# Patient Record
Sex: MALE
[De-identification: names, ages, dates, MRNs, and addresses within clinical notes are randomized per-mention and may not be internally consistent; named-entity substitution may affect disease eponyms.]

---

## 2017-01-06 ENCOUNTER — CHARTING TRANS (OUTPATIENT)
Dept: OTHER | Age: 47
End: 2017-01-06

## 2018-11-06 VITALS — HEART RATE: 86 BPM | BODY MASS INDEX: 32.58 KG/M2 | HEIGHT: 69 IN | WEIGHT: 220 LBS | TEMPERATURE: 98.7 F

## 2019-01-29 ENCOUNTER — OFFICE VISIT (OUTPATIENT)
Dept: FAMILY MEDICINE CLINIC | Facility: CLINIC | Age: 49
End: 2019-01-29

## 2019-01-29 VITALS
SYSTOLIC BLOOD PRESSURE: 138 MMHG | HEART RATE: 78 BPM | TEMPERATURE: 97 F | OXYGEN SATURATION: 99 % | DIASTOLIC BLOOD PRESSURE: 78 MMHG | RESPIRATION RATE: 18 BRPM | BODY MASS INDEX: 31.68 KG/M2 | HEIGHT: 67.5 IN | WEIGHT: 204.25 LBS

## 2019-01-29 DIAGNOSIS — Z00.00 HEALTHCARE MAINTENANCE: ICD-10-CM

## 2019-01-29 DIAGNOSIS — J01.01 ACUTE RECURRENT MAXILLARY SINUSITIS: Primary | ICD-10-CM

## 2019-01-29 PROCEDURE — 99204 OFFICE O/P NEW MOD 45 MIN: CPT | Performed by: FAMILY MEDICINE

## 2019-01-29 RX ORDER — FLUTICASONE PROPIONATE 50 MCG
2 SPRAY, SUSPENSION (ML) NASAL DAILY
Qty: 3 BOTTLE | Refills: 3 | Status: SHIPPED | OUTPATIENT
Start: 2019-01-29

## 2019-01-29 RX ORDER — AMOXICILLIN 500 MG/1
500 CAPSULE ORAL 3 TIMES DAILY
Qty: 30 CAPSULE | Refills: 0 | Status: SHIPPED | OUTPATIENT
Start: 2019-01-29 | End: 2019-02-08

## 2019-01-29 NOTE — PROGRESS NOTES
[facility] [place]  PROGRESS NOTE  Chief Complaint:   Patient presents with:  Establish Care  Runny Nose  Body ache and/or chills  Cough      HPI:   This is a 51year old male coming in to establish care.   He said that unfortunately he does not rest.  RESPIRATORY: He has been coughing. GASTROINTESTINAL:  Denies abdominal pain, nausea, vomiting, constipation, diarrhea, or blood in stool. MUSCULOSKELETAL:  Denies weakness, muscle aches, back pain, joint pain, swelling or stiffness.   NEUROLOGICAL: bowel sounds normal in all 4 quadrants, no masses, no hepatosplenomegaly. EXTREMITIES:  No edema, no cyanosis, no clubbing, FROM, 2+ dorsalis pedis pulses bilaterally. ASSESSMENT AND PLAN:   1.  Acute recurrent maxillary sinusitis  He has acute sinusiti

## 2019-03-06 ENCOUNTER — OFFICE VISIT (OUTPATIENT)
Dept: FAMILY MEDICINE CLINIC | Facility: CLINIC | Age: 49
End: 2019-03-06

## 2019-03-06 VITALS
BODY MASS INDEX: 30.4 KG/M2 | SYSTOLIC BLOOD PRESSURE: 146 MMHG | DIASTOLIC BLOOD PRESSURE: 82 MMHG | WEIGHT: 196 LBS | HEART RATE: 96 BPM | OXYGEN SATURATION: 97 % | HEIGHT: 67.5 IN | TEMPERATURE: 101 F

## 2019-03-06 DIAGNOSIS — R50.9 FEVER, UNSPECIFIED FEVER CAUSE: Primary | ICD-10-CM

## 2019-03-06 LAB
FLUAV + FLUBV RNA SPEC NAA+PROBE: NEGATIVE
FLUAV + FLUBV RNA SPEC NAA+PROBE: NEGATIVE
FLUAV + FLUBV RNA SPEC NAA+PROBE: POSITIVE

## 2019-03-06 PROCEDURE — 87502 INFLUENZA DNA AMP PROBE: CPT | Performed by: NURSE PRACTITIONER

## 2019-03-06 PROCEDURE — 87798 DETECT AGENT NOS DNA AMP: CPT | Performed by: NURSE PRACTITIONER

## 2019-03-06 PROCEDURE — 99213 OFFICE O/P EST LOW 20 MIN: CPT | Performed by: NURSE PRACTITIONER

## 2019-03-06 RX ORDER — ALBUTEROL SULFATE 90 UG/1
AEROSOL, METERED RESPIRATORY (INHALATION)
Qty: 1 INHALER | Refills: 1 | Status: SHIPPED | OUTPATIENT
Start: 2019-03-06

## 2019-03-06 RX ORDER — AZITHROMYCIN 250 MG/1
TABLET, FILM COATED ORAL
Qty: 6 TABLET | Refills: 0 | Status: SHIPPED | OUTPATIENT
Start: 2019-03-06 | End: 2020-06-05 | Stop reason: ALTCHOICE

## 2019-03-06 RX ORDER — OSELTAMIVIR PHOSPHATE 75 MG/1
75 CAPSULE ORAL 2 TIMES DAILY
Qty: 10 CAPSULE | Refills: 0 | Status: SHIPPED | OUTPATIENT
Start: 2019-03-06 | End: 2019-03-11

## 2019-03-06 NOTE — PROGRESS NOTES
CHIEF COMPLAINT:   Patient presents with:  Cough  Fever      HPI:   Ute Mcknight is a 51year old male who presents to clinic today with complaints of feeling ill in since yesterday, coughing, no runny nose,  History of bronchitis   Sweeps and dusts and d pharynx not erythematous or injected. No exudates. NECK: supple, non-tender  THYROID: Normal size, no nodules  LUNGS: scattered rhonchi throughout- harsh, bronchial cough. Breathing is non labored.   CARDIO: RRR without murmur  SKIN: no rashes,no suspicio needed          AMOL Dill, FNP-BC  3/6/2019   3:56 PM

## 2019-03-06 NOTE — PATIENT INSTRUCTIONS
Rest, increase fluids, salt water gargles ,neti pot (use distilled water) or saline nasal spray, Mucinex-D (behind the counter),- monitor blood pressure  (<120/80 - or at least < 140/90)   Tylenol/Ibuprofen, follow up if symptoms persist or increase.

## 2019-03-07 ENCOUNTER — TELEPHONE (OUTPATIENT)
Dept: FAMILY MEDICINE CLINIC | Facility: CLINIC | Age: 49
End: 2019-03-07

## 2019-03-07 NOTE — TELEPHONE ENCOUNTER
----- Message from SERGEY Guan sent at 3/7/2019  8:47 AM CST -----  Please notify patient that he was positive for influenza A–continue Tamiflu as we discussed follow-up if any signs of secondary infection such as pneumonia.   Let patient know to

## 2019-03-23 NOTE — LETTER
Patient Name: Kalpesh Kim  YOB: 1970          MRN :  [id number]  Date:  10/2/2023  Referring Physician:  Morro Mg           Progress Summary  Pt has attended 5 visits in Physical Therapy. Diagnosis:   Low back pain   Lumbar radiculopathy M54.16   Chronic right shoulder pain (M25.511,G89.29)  Right hip pain (M25.551)      Referring Provider: Osvaldo Voss  Date of Evaluation:    9/18/23    Precautions:  None Next MD visit:   10/13/23 Dr. Dillon Rendon in PM&R  Date of Surgery: n/a   Insurance Primary/Secondary: [facility] / N/A     # Auth Visits: 5 visits             Subjective: Pt states  he was very sore in his low back after last session and then lifting at work. He states it resolved yesterday and he feels pretty good today. He states he just got a new mattress and feels that is helping. He reports a little tingling on the L side. Pain: 2/10 tail bone      Objective:   10/2/23- SLS R LE 22 LLE 14 sec   9/26/23- more level SI-  TTP R piriformis  9/22/23- hypertonic L thoracolumbar paraspinals Sijt rotation R tilt      Assessment: Pt is making gains in skilled PT meeting 3/10 goals and progressing toward remainder. Pt has demonstrated improved strength and pelvic stability as evidenced by increased SLS time. Pt improving control of deep core but fatigue is limiting. Work related positioning and strains impacting progress. CORINA improving. Pt will benefit from continued skilled physical therapy to address the above limitations and below goals to facilitate return to PLOF.     Goals:   (to be met in 8 visits)  Pt will improve hip ABD and ER strength to 5/5 to increase ease with standing and walking   Pt will be able to squat to  light objects around the house with <3/10 pain  10/2/23- Ongoing  Pt will improve functional hip strength to report ability to ascend/descend 1 flight of stairs reciprocally without use of handrail 10/2/23 GOAL MET  Pt will improve transversus abdominis recruitment to perform 91%   BMI 22.48 kg/m²     General appearance:  No apparent distress, appears stated age and cooperative. HEENT:  Normal cephalic, atraumatic without obvious deformity. Pupils equal, round, and reactive to light. Extra ocular muscles intact. Conjunctivae/corneas clear. Neck: Supple, with full range of motion. No jugular venous distention. Trachea midline. Respiratory:  Normal respiratory effort. Clear to auscultation, bilaterally without Rales/Wheezes/Rhonchi. Cardiovascular:  Regular rate and rhythm with normal S1/S2 without murmurs, rubs or gallops. Abdomen: Soft, non-tender, non-distended with normal bowel sounds. Musculoskeletal:  No clubbing, cyanosis or edema bilaterally. Full range of motion without deformity. Skin: Skin color, texture, turgor normal.  No rashes or lesions. Neurologic:    Alert, oriented x1  Neurovascularly intact without any focal sensory/motor deficits. Cranial nerves: II-XII intact, grossly non-focal.  Psychiatric:  Alert and oriented, thought content appropriate, normal insight  Capillary Refill: Brisk,< 3 seconds   Peripheral Pulses: +2 palpable, equal bilaterally     Labs:   Recent Labs     03/21/19 1954   WBC 7.8   HGB 12.6   HCT 38.4        Recent Labs     03/21/19 1954 03/22/19  0736    141   K 4.7 4.4   CL 97* 103   CO2 28 27   BUN 30* 23*   CREATININE 1.2 1.1   CALCIUM 9.4 8.9     Recent Labs     03/21/19 1954   AST 28   ALT 24   BILITOT 0.8   ALKPHOS 101     Recent Labs     03/21/19 1954   INR 1.01     No results for input(s): Florentino Medina in the last 72 hours. Urinalysis:      Lab Results   Component Value Date    NITRU POSITIVE 03/21/2019    WBCUA >100 03/21/2019    BACTERIA 4+ 03/21/2019    RBCUA see below 03/21/2019    BLOODU SMALL 03/21/2019    SPECGRAV 1.015 03/21/2019    GLUCOSEU Negative 03/21/2019    GLUCOSEU NEGATIVE 04/01/2011       Radiology:  CT HEAD WO CONTRAST   Final Result   No acute intracranial abnormality.          XR CHEST proper isometric contraction without requiring verbal or tactile cuing to promote advancement of therex  10/2/23 Progressing  Pt will demonstrate good understanding of proper posture and body mechanics to decrease pain and improve spinal safety 10/2/23 GOAL MET  Pt will report improved symptom centralization and absence of radicular symptoms for 3 consecutive days to improve function with ADL 10/2/23 Ongoing  Pt will have decreased paraspinal mm tension to tolerate standing >30 minutes for work and home activities 10/2/23- ongoing 15 mins  Pt will demonstrate improved core strength to be able to perform dead bug with <4/10 pain 10/2/23 Ongoing  Pt will demonstrate improved SLS to >10 seconds QUINN to promote safety and decrease risk of falls on uneven surfaces such as grass and gravel 10/2/23 GOAL MET  Pt will be independent and compliant with comprehensive HEP to maintain progress achieved in PT 10/2/23- ongoing    Plan: Progress ROM as able along with core bracing and nerve tensioning. Add monster walks next visit. Date: 9/22/2023  TX#: 2/5 Date: 9/26/23                TX#: 3/5 Date: 9/28/23                 TX#: 4/5 Date: 10/2/23                TX#: 5/5 Date:    Tx#: 6/ THERE EX:  Manual glut, piriformis stretch 30 secs 3x eahc LE  Figure 4 and corss body piriformis stretch 30 secs 3x each *  SL clams 10x3 each*  Sciatic nerve flossing manual supine 10x2 RLE   THERE EX:  Warm up Treadmill walking cues for arm swing 5 mins 2 mph lack of lumbar rotation noted, more lateral flexion - co in R hip  Standing gastroc incline board and soleus stretch 30 secs 3x RLE  PPT progressing to segemental bridge 10x*  Manual glut, piriformis stretch 30 secs 3x eahc LE  Blue band hip ABD with TA brace 10x2 THERE EX:  Warm up Treadmill walking cues for arm swing 5 mins 2.5 mph lack of lumbar rotation noted, more lateral flexion - co in R hip  Standing gastroc incline board and soleus stretch 30 secs 3x RLE  TA brace push down foam PORTABLE   Final Result   No acute abnormality. Overall clear appearing lungs. Assessment/Plan:    Active Hospital Problems    Diagnosis Date Noted    Acute encephalopathy [G93.40] 03/21/2019    Essential hypertension [I10] 01/27/2016    Type 2 diabetes mellitus without complication ([facility] [address] [G80.1] 01/27/2016    Mixed hyperlipidemia [E78.2] 01/27/2016    UTI (urinary tract infection) [N39.0] 05/02/2014     Acute metabolic encephalopathy - likely due to UTI. CT head was negative. Waxing an waning mental status likely due to infection from UTI. Treat underlying cause with antibiotics, and reassess mental status on a daily basis. If no improvement in 24-48 hrs can consider MRI to r/o CVA, low suspicion for CVA currently. Patient may have a difficult time in MRI due to confusion. Per family, may have component of mild cognitive deficit or dementia at baseline but has not been formally diagnosed.      UTI (urinary tract infection) - iv rocephin started, f/u cultures and tailor antiBx accordingly     Essential hypertension - uncontrolled on arrival, resume home medication and added norvasc.     Type 2 diabetes mellitus - last A1C 8.4, held metformin, started on SSI with Accu-Cheks     Mixed hyperlipidemia - resume statin     DVT Prophylaxis: lovenox  Diet: DIET CARB CONTROL;   Dietary Nutrition Supplements: Frozen Oral Supplement  Code Status: Full Code    PT/OT Eval Status: ECF with PT/OT    Dispo - 1-2 days, will need formally dispo plan with CM and family    Taran Villanueva MD roll hook lying 15x 3 ec hold  TA march 10x2   PPT progressing to segemental bridge 10x  Manual glut, piriformis stretch 30 secs 3x eahc LE  Blue band hip ABD with TA brace 10x2 THERE EX:  Warm up Treadmill walking cues for arm swing 8 mins 2.5 mph   Standing gastroc incline board and soleus stretch 30 secs 3x RLE  Manual glut, piriformis stretch 30 secs 3x eahc LE  TA brace push down foam roll hook lying 15x 3 sec hold  Core foam roll isometric seated 5x push/pull twist/twist mod tactile cues    MANUAL:  Prone Stm B thoracolumbar paraspianls into R QL and glut 15 mins  Prone P T-L spine 5 mins grade 3-4  Prone PA SI L sided to reduce right rot grade 3-4 3 mins MANUAL:  Prone Stm B thoracolumbar paraspianls into R QL and glut 12 mins  Prone P T-L spine 5 mins grade 3-4  Prone PA SI L sided to reduce right rot grade 3-4 3 mins  Prone press up to hands 5x 5' 3x with PT OP MANUAL:  SL L side down SI MET 3x 10sec  Prone STM B thoracolumbar paraspianls into R QL and glut 12 mins  Prone P T-L spine 5 mins grade 3-4  Prone PA SI L sided to reduce right rot grade 3-4 3 mins  Prone press up to hands 5x 5' 3x with PT OP MANUAL:  Prone STM B thoracolumbar paraspianls into R QL and L glut/piriformis 15 mins  Prone P T-L spine 5 mins grade 3-4                    HEP: Denoted with * Has offloading coccyx cushions, and folded pillow for prolonged sitting or travel, seated sciatic nerve flossing RLE 10x2 daily, sitting lumbar flexion stretch 30 secs 3x  from eval    Charges: 1 manual 2 ther ex       Total Timed Treatment: 45 min  Total Treatment Time: 45 min  Oswestry Disability Index Score  Score: 58 % (9/18/2023  9:31 AM)    Post Oswestry Disability Index Score  Post Score: 34 % (10/2/2023  7:43 AM)    24 % improvement    Plan: Continue skilled Physical Therapy 1-2 x/week or a total of 2 visits over a 90 day period. Treatment will include: Manual Therapy; Therapeutic Exercises; Neuromuscular Re-education;  Therapeutic Activity; Electrical Stim; Ultrasound; Pt education; Home exercise program instructions; Patient/Family/Caregiver was advised of these findings, precautions, and treatment options and has agreed to actively participate in planning and for this course of care. Thank you for your referral. If you have any questions, please contact me at Dept: [phone]. Sincerely,  Electronically signed by therapist: Maged Gupta, PT, DPT    Physician's certification required:  Yes  Please co-sign or sign and return this letter via fax as soon as possible to [phone]. I certify the need for these services furnished under this plan of treatment and while under my care. X___________________________________________________ Date____________________    Certification From: 05/8/8073  To:12/31/2023 21st Century Cures Act Notice to Patient: Medical documents like this are made available to patients in the interest of transparency. However, be advised this is a medical document and it is intended as elru-yw-flzt communication between your medical providers. This medical document may contain abbreviations, assessments, medical data, and results or other terms that are unfamiliar. Medical documents are intended to carry relevant information, facts as evident, and the clinical opinion of the practitioner. As such, this medical document may be written in language that appears blunt or direct. You are encouraged to contact your medical provider and/or [facility] if you have any questions about this medical document.

## 2019-03-30 ENCOUNTER — LABORATORY ENCOUNTER (OUTPATIENT)
Dept: LAB | Age: 49
End: 2019-03-30
Attending: FAMILY MEDICINE
Payer: COMMERCIAL

## 2019-03-30 DIAGNOSIS — Z00.00 HEALTHCARE MAINTENANCE: ICD-10-CM

## 2019-03-30 PROCEDURE — 84550 ASSAY OF BLOOD/URIC ACID: CPT

## 2019-03-30 PROCEDURE — 85025 COMPLETE CBC W/AUTO DIFF WBC: CPT

## 2019-03-30 PROCEDURE — 36415 COLL VENOUS BLD VENIPUNCTURE: CPT

## 2019-03-30 PROCEDURE — 84443 ASSAY THYROID STIM HORMONE: CPT

## 2019-03-30 PROCEDURE — 80061 LIPID PANEL: CPT

## 2019-03-30 PROCEDURE — 80053 COMPREHEN METABOLIC PANEL: CPT

## 2019-04-01 ENCOUNTER — TELEPHONE (OUTPATIENT)
Dept: FAMILY MEDICINE CLINIC | Facility: CLINIC | Age: 49
End: 2019-04-01

## 2019-04-01 NOTE — TELEPHONE ENCOUNTER
Pt informed pt of his blood work results. Pt will watch diet and schedule px in the summer. Pt expressed understanding and thanks.

## 2019-04-01 NOTE — TELEPHONE ENCOUNTER
----- Message from Philippe Mackay MD sent at 4/1/2019  8:13 AM CDT -----  Please call Krystal Nieto.   His PSA level is 2.42 which is normal.  His uric acid is normal.  His thyroid is normal.  His cholesterol is 227 and this is slightly elevated but not enough to

## 2019-12-30 ENCOUNTER — TELEPHONE (OUTPATIENT)
Dept: FAMILY MEDICINE CLINIC | Facility: CLINIC | Age: 49
End: 2019-12-30

## 2019-12-30 DIAGNOSIS — Z00.00 HEALTHCARE MAINTENANCE: Primary | ICD-10-CM

## 2019-12-30 NOTE — TELEPHONE ENCOUNTER
Patient states that he needs to schedule lab appt from last visit, needs orders. Also patient needs a referral for his hip pain, Dr Francisca Myles has no openings until end of January. Would like a call call back.

## 2019-12-30 NOTE — TELEPHONE ENCOUNTER
Patient with increased hip pain. Requesting referal.  Appt given with Vale Petit Friday 1/3 for evaluation. Also requesting order for lab orders 4/2020.   Yu Arnold, 12/30/19, 5:02 PM

## 2020-06-01 ENCOUNTER — LABORATORY ENCOUNTER (OUTPATIENT)
Dept: LAB | Age: 50
End: 2020-06-01
Attending: FAMILY MEDICINE
Payer: COMMERCIAL

## 2020-06-01 DIAGNOSIS — Z00.00 HEALTHCARE MAINTENANCE: ICD-10-CM

## 2020-06-01 PROCEDURE — 84443 ASSAY THYROID STIM HORMONE: CPT

## 2020-06-01 PROCEDURE — 80061 LIPID PANEL: CPT

## 2020-06-01 PROCEDURE — 85025 COMPLETE CBC W/AUTO DIFF WBC: CPT

## 2020-06-01 PROCEDURE — 36415 COLL VENOUS BLD VENIPUNCTURE: CPT

## 2020-06-01 PROCEDURE — 84550 ASSAY OF BLOOD/URIC ACID: CPT

## 2020-06-01 PROCEDURE — 80053 COMPREHEN METABOLIC PANEL: CPT

## 2020-06-05 ENCOUNTER — OFFICE VISIT (OUTPATIENT)
Dept: FAMILY MEDICINE CLINIC | Facility: CLINIC | Age: 50
End: 2020-06-05
Payer: COMMERCIAL

## 2020-06-05 VITALS
DIASTOLIC BLOOD PRESSURE: 88 MMHG | OXYGEN SATURATION: 98 % | HEART RATE: 69 BPM | SYSTOLIC BLOOD PRESSURE: 130 MMHG | HEIGHT: 67.25 IN | BODY MASS INDEX: 30.4 KG/M2 | WEIGHT: 196 LBS | TEMPERATURE: 98 F | RESPIRATION RATE: 18 BRPM

## 2020-06-05 DIAGNOSIS — J30.1 SEASONAL ALLERGIC RHINITIS DUE TO POLLEN: ICD-10-CM

## 2020-06-05 DIAGNOSIS — E78.01 FAMILIAL HYPERCHOLESTEROLEMIA: ICD-10-CM

## 2020-06-05 DIAGNOSIS — Z00.00 HEALTHCARE MAINTENANCE: Primary | ICD-10-CM

## 2020-06-05 PROCEDURE — 99396 PREV VISIT EST AGE 40-64: CPT | Performed by: FAMILY MEDICINE

## 2020-06-05 NOTE — PROGRESS NOTES
[facility] [place]  PROGRESS NOTE  Chief Complaint:   Patient presents with:  Physical      HPI:   This is a 45year old male coming in for his annual wellness visit. He was seen in the emergency department in May.   He had an abscess in the t SCREEN   Result Value Ref Range    Prostate Specific Antigen Screen 3.99 <=4.00 ng/mL   ASSAY, THYROID STIM HORMONE   Result Value Ref Range    TSH 2.260 0.358 - 3.740 mIU/mL   URIC ACID, SERUM   Result Value Ref Range    Uric Acid 4.2 3.5 - 7.2 mg/dL   CB daily. 3 Bottle 3      Counseling given: Not Answered       REVIEW OF SYSTEMS:   CONSTITUTIONAL:  Denies unusual weight gain/loss, fever, chills, or fatigue. EENT:  Eyes:  Denies eye pain, visual loss, blurred vision, double vision or yellow sclerae.  Ears External normal. Nose: patent, no nasal discharge Throat:  No tonsillar erythema or exudate. Mouth: No redness or irritation inside the mouth now. He is missing the molars on the right side of the lower mouth.   NECK: Supple, no CLAD, no JVD, no thyromega done.   Outcome: Patient verbalizes understanding. Patient is notified to call with any questions, complications, allergies, or worsening or changing symptoms.   Patient is to call with any side effects or complications from the treatments as a result of to

## 2020-08-17 ENCOUNTER — TELEPHONE (OUTPATIENT)
Dept: FAMILY MEDICINE CLINIC | Facility: CLINIC | Age: 50
End: 2020-08-17

## 2020-08-17 NOTE — TELEPHONE ENCOUNTER
Patient needs to get dental work done and they will not do it until BP is under control. Was seen recently for BP. Needs OK/Clearance to have dental work done.

## 2020-08-17 NOTE — TELEPHONE ENCOUNTER
----- Message from Keyshawn Pacheco sent at 8/17/2020  4:14 PM CDT -----  Regarding: [facility]    [phone]

## 2020-08-17 NOTE — TELEPHONE ENCOUNTER
Patient is trying to get a tooth pulled. States his blood pressure's have been elevated. Dentist:  160's/ 102-111. At home:  165/98. Dentist will not pull tooth until his   Blood pressures are under control.     Appt given tomorrow for evaluation of

## 2020-08-18 ENCOUNTER — OFFICE VISIT (OUTPATIENT)
Dept: FAMILY MEDICINE CLINIC | Facility: CLINIC | Age: 50
End: 2020-08-18
Payer: COMMERCIAL

## 2020-08-18 VITALS
RESPIRATION RATE: 18 BRPM | HEIGHT: 67.25 IN | WEIGHT: 197.19 LBS | DIASTOLIC BLOOD PRESSURE: 80 MMHG | HEART RATE: 58 BPM | TEMPERATURE: 98 F | OXYGEN SATURATION: 97 % | SYSTOLIC BLOOD PRESSURE: 132 MMHG | BODY MASS INDEX: 30.59 KG/M2

## 2020-08-18 DIAGNOSIS — I10 HTN (HYPERTENSION), BENIGN: Primary | ICD-10-CM

## 2020-08-18 PROCEDURE — 3008F BODY MASS INDEX DOCD: CPT | Performed by: NURSE PRACTITIONER

## 2020-08-18 PROCEDURE — 3075F SYST BP GE 130 - 139MM HG: CPT | Performed by: NURSE PRACTITIONER

## 2020-08-18 PROCEDURE — 99213 OFFICE O/P EST LOW 20 MIN: CPT | Performed by: NURSE PRACTITIONER

## 2020-08-18 PROCEDURE — 3079F DIAST BP 80-89 MM HG: CPT | Performed by: NURSE PRACTITIONER

## 2020-08-18 RX ORDER — LISINOPRIL 5 MG/1
5 TABLET ORAL DAILY
Qty: 30 TABLET | Refills: 2 | Status: SHIPPED | OUTPATIENT
Start: 2020-08-18 | End: 2020-12-17

## 2020-08-18 NOTE — PROGRESS NOTES
HPI:   Subjective Patient presents with:  Blood Pressure     Jaron Merino is a 45year old male who presents for follow-up of elevated blood pressure. He is exercising and is adherent to a low-salt diet. Blood pressure is not well controlled at home.  Card WBC 8.8 06/01/2020 10:28 AM    HGB 14.4 06/01/2020 10:28 AM    HCT 42.2 06/01/2020 10:28 AM    .0 06/01/2020 10:28 AM         CMP  (most recent labs)   Lab Results   Component Value Date/Time    GLU 91 06/01/2020 10:28 AM    BUN 13 06/01/2020 10:28 Discussion: Normal blood pressure (systolic <999 mmHg and diastolic <40 mmHg)  Cardiovascular risk factors: hypertension  Evidence of target organ damage: none. PLAN:   Medication changes per orders.   Dietary Sodium Restriction  Increased Physical Activ Choose heart-healthy foods  · Select low-salt, low-fat foods. Limit sodium intake to 2,400 mg per day or the amount suggested by your healthcare provider. · Limit canned, dried, cured, packaged, and fast foods. These can contain a lot of salt.   · Eat 8 to · Talk with your healthcare provider about quitting smoking. Smoking significantly increases your risk for heart disease and stroke. Ask your healthcare provider about community smoking cessation programs and other options.     Medicines  If lifestyle franco

## 2020-08-18 NOTE — PATIENT INSTRUCTIONS
Start lisinopril daily. Check blood pressure daily - vary times during the day. Call if >160/100. Controlling High Blood Pressure  High blood pressure (hypertension) is often called the silent killer.  This is because many people who have it, don’t the DASH eating plan. This plan helps reduce blood pressure. · When you go to a restaurant, ask that your meal be prepared with no added salt. Stay at a healthy weight  · Ask your healthcare provider how many calories to eat a day.  Then stick to that n educational content on 6/1/2019  © 3773-5706 [employer] [address]. [facility] [facility] [place] [address] [place]. All rights reserved. This information is not intended as a substitute for professional medical care.  Always follow your healthcare profession

## 2020-08-21 ENCOUNTER — TELEPHONE (OUTPATIENT)
Dept: FAMILY MEDICINE CLINIC | Facility: CLINIC | Age: 50
End: 2020-08-21

## 2020-08-21 RX ORDER — LISINOPRIL 10 MG/1
5 TABLET ORAL DAILY
Qty: 15 TABLET | Refills: 2 | Status: SHIPPED | OUTPATIENT
Start: 2020-08-21 | End: 2020-12-17

## 2020-08-21 RX ORDER — LISINOPRIL 10 MG/1
5 TABLET ORAL DAILY
COMMUNITY
End: 2020-08-21

## 2020-08-21 NOTE — TELEPHONE ENCOUNTER
[facility] sent a fax stating that Lisinopril 5 mg is on back order. Can they change it to 2.5 mg or 10 mg cut in half? Please fax new script.

## 2020-12-17 RX ORDER — LISINOPRIL 10 MG/1
5 TABLET ORAL DAILY
Qty: 15 TABLET | Refills: 0 | Status: SHIPPED | OUTPATIENT
Start: 2020-12-17 | End: 2021-01-22

## 2020-12-17 NOTE — TELEPHONE ENCOUNTER
Future appt:    Last Appointment with provider:   8/18/2020 HTN follow up  Last appointment at [facility]:  8/18/2020  Cholesterol, Total (mg/dL)   Date Value   06/01/2020 223 (H)     HDL Cholesterol (mg/dL)   Date Value   06/01/2020 29 (L)     LDL Page

## 2021-01-22 RX ORDER — LISINOPRIL 5 MG/1
TABLET ORAL
Qty: 30 TABLET | Refills: 0 | Status: SHIPPED | OUTPATIENT
Start: 2021-01-22 | End: 2021-02-25

## 2021-01-22 NOTE — TELEPHONE ENCOUNTER
SERGEY MCNEAL is out of the office today. 30-day refill sent to pharmacy. My chart message sent to patient stating he needs a visit for future refills.

## 2021-01-22 NOTE — TELEPHONE ENCOUNTER
Future appt:    Last Appointment with provider:   8/18/2020; No f/u recommended    Last appointment at [facility]:  8/18/2020  Cholesterol, Total (mg/dL)   Date Value   06/01/2020 223 (H)     HDL Cholesterol (mg/dL)   Date Value   06/01/2020 29 (L)     L

## 2021-02-22 NOTE — TELEPHONE ENCOUNTER
Future appt:    Last Appointment with provider: 8/12/20 HTN follow up.  Last physical 6/5/20  Last appointment at [facility] [place]:  Visit date not found  Cholesterol, Total (mg/dL)   Date Value   06/01/2020 223 (H)     HDL Cholesterol (mg/dL)   Date Value   06

## 2021-02-25 RX ORDER — LISINOPRIL 5 MG/1
TABLET ORAL
Qty: 30 TABLET | Refills: 0 | Status: SHIPPED | OUTPATIENT
Start: 2021-02-25 | End: 2021-03-24

## 2021-02-25 NOTE — TELEPHONE ENCOUNTER
Future Appointments   Date Time Provider Celestine Greene   3/2/2021  3:00 PM Leidy Engel APRN [facility]

## 2021-03-02 ENCOUNTER — OFFICE VISIT (OUTPATIENT)
Dept: FAMILY MEDICINE CLINIC | Facility: CLINIC | Age: 51
End: 2021-03-02

## 2021-03-02 VITALS
HEART RATE: 65 BPM | BODY MASS INDEX: 30.81 KG/M2 | SYSTOLIC BLOOD PRESSURE: 126 MMHG | TEMPERATURE: 98 F | DIASTOLIC BLOOD PRESSURE: 74 MMHG | WEIGHT: 198.63 LBS | OXYGEN SATURATION: 96 % | RESPIRATION RATE: 18 BRPM | HEIGHT: 67.25 IN

## 2021-03-02 DIAGNOSIS — I10 HTN (HYPERTENSION), BENIGN: Primary | ICD-10-CM

## 2021-03-02 PROCEDURE — 3008F BODY MASS INDEX DOCD: CPT | Performed by: NURSE PRACTITIONER

## 2021-03-02 PROCEDURE — 99213 OFFICE O/P EST LOW 20 MIN: CPT | Performed by: NURSE PRACTITIONER

## 2021-03-02 PROCEDURE — 3078F DIAST BP <80 MM HG: CPT | Performed by: NURSE PRACTITIONER

## 2021-03-02 PROCEDURE — 3074F SYST BP LT 130 MM HG: CPT | Performed by: NURSE PRACTITIONER

## 2021-03-02 NOTE — PROGRESS NOTES
HPI:   Subjective Patient presents with: Follow - Up: BP     Vini Celestin is a 45year old male who presents for follow-up of elevated blood pressure. He is exercising and is adherent to a low-salt diet. Blood pressure is well controlled at home.  Isabella Hoff TAKE 1 TABLET BY MOUTH DAILY    •  Albuterol Sulfate HFA (VENTOLIN HFA) 108 (90 Base) MCG/ACT Inhalation Aero Soln, 1-2 puffs every 4-6 hrs as needed    •  Fluticasone Propionate 50 MCG/ACT Nasal Suspension, 2 sprays by Each Nare route daily.     No current distress  SKIN: no rashes,no suspicious lesions  NECK: supple,no adenopathy,no bruits  LUNGS: clear to auscultation  CARDIO: RRR without murmur  GI: good BS's,no masses, HSM or tenderness  EXTREMITIES: no cyanosis, clubbing or edema  NEURO: A&O to person p

## 2021-03-02 NOTE — PATIENT INSTRUCTIONS
Continue current medications. Schedule annual wellness in June with Dr. Henny Klein. Otherwise follow-up as needed.

## 2021-03-24 RX ORDER — LISINOPRIL 5 MG/1
TABLET ORAL
Qty: 30 TABLET | Refills: 2 | Status: SHIPPED | OUTPATIENT
Start: 2021-03-24 | End: 2021-06-22

## 2021-03-24 NOTE — TELEPHONE ENCOUNTER
Future appt:    Last Appointment with provider:   3/2/2021 HTN follow up  Last appointment at [facility] [place]:  3/2/2021  Cholesterol, Total (mg/dL)   Date Value   06/01/2020 223 (H)     HDL Cholesterol (mg/dL)   Date Value   06/01/2020 29 (L)     LDL Cholest

## 2021-06-22 RX ORDER — LISINOPRIL 5 MG/1
5 TABLET ORAL DAILY
Qty: 90 TABLET | Refills: 1 | Status: SHIPPED | OUTPATIENT
Start: 2021-06-22 | End: 2021-12-17

## 2021-06-22 NOTE — TELEPHONE ENCOUNTER
Future appt:    Last Appointment with provider:   3/2/2021 HTN f/u with Sonia Patch, APRN  Last appointment at [facility]:  3/2/2021  Cholesterol, Total (mg/dL)   Date Value   06/01/2020 223 (H)     HDL Cholesterol (mg/dL)   Date Value   06/01/2020 29 (L)

## 2021-12-16 ENCOUNTER — TELEPHONE (OUTPATIENT)
Dept: FAMILY MEDICINE CLINIC | Facility: CLINIC | Age: 51
End: 2021-12-16

## 2021-12-16 DIAGNOSIS — Z12.11 SCREENING FOR COLON CANCER: Primary | ICD-10-CM

## 2021-12-16 NOTE — TELEPHONE ENCOUNTER
Pt called back to make appt for physical. Would like refill until appt.     Future Appointments   Date Time Provider Celestine Ramona   1/10/2022  8:00 AM Carol Engel APRN [facility]

## 2021-12-16 NOTE — TELEPHONE ENCOUNTER
Future appt:    Last Appointment with provider: 6/5/20 for annual physical. mychart message sent.   Last appointment at [facility] [place]:  3/2/21 with CS  for HTN follow up  Cholesterol, Total (mg/dL)   Date Value   06/01/2020 223 (H)     HDL Cholesterol (mg/dL

## 2021-12-17 RX ORDER — LISINOPRIL 5 MG/1
TABLET ORAL
Qty: 90 TABLET | Refills: 1 | Status: SHIPPED | OUTPATIENT
Start: 2021-12-17

## 2021-12-17 NOTE — TELEPHONE ENCOUNTER
Informed pt that referral has been entered. Information given for pt to schedule. No further questions.

## 2021-12-22 ENCOUNTER — TELEPHONE (OUTPATIENT)
Dept: FAMILY MEDICINE CLINIC | Facility: CLINIC | Age: 51
End: 2021-12-22

## 2021-12-22 NOTE — TELEPHONE ENCOUNTER
Patient states he was at a [employer] Sunday with approx 50 people. Sunday night patient started with Chills/Temp 100/Body aches. Monday Home Covid19 Test/Positive. Patient states all the above sx are gone.   Now with nasal congestion/slight c [facility] [phone] [phone]  [phone]        Last Appointment with provider:   Visit date not found  Last appointment at [facility]:  Visit date not found  Cholesterol, Total (mg/dL)   Date Value   06/01/2020 223 (H

## 2021-12-23 ENCOUNTER — TELEMEDICINE (OUTPATIENT)
Dept: FAMILY MEDICINE CLINIC | Facility: CLINIC | Age: 51
End: 2021-12-23

## 2021-12-23 VITALS
HEIGHT: 67.25 IN | BODY MASS INDEX: 30.25 KG/M2 | DIASTOLIC BLOOD PRESSURE: 98 MMHG | TEMPERATURE: 97 F | WEIGHT: 195 LBS | SYSTOLIC BLOOD PRESSURE: 148 MMHG

## 2021-12-23 DIAGNOSIS — R09.81 NASAL CONGESTION: Primary | ICD-10-CM

## 2021-12-23 DIAGNOSIS — U07.1 COVID-19 VIRUS DETECTED: ICD-10-CM

## 2021-12-23 DIAGNOSIS — R52 BODY ACHES: ICD-10-CM

## 2021-12-23 DIAGNOSIS — R53.83 OTHER FATIGUE: ICD-10-CM

## 2021-12-23 DIAGNOSIS — R68.83 CHILLS (WITHOUT FEVER): ICD-10-CM

## 2021-12-23 PROCEDURE — 3080F DIAST BP >= 90 MM HG: CPT | Performed by: NURSE PRACTITIONER

## 2021-12-23 PROCEDURE — 3008F BODY MASS INDEX DOCD: CPT | Performed by: NURSE PRACTITIONER

## 2021-12-23 PROCEDURE — 3077F SYST BP >= 140 MM HG: CPT | Performed by: NURSE PRACTITIONER

## 2021-12-23 PROCEDURE — 99213 OFFICE O/P EST LOW 20 MIN: CPT | Performed by: NURSE PRACTITIONER

## 2021-12-23 NOTE — PROGRESS NOTES
Visit for Respiratory Illness - Potential COVID-19 Infection    This visit is conducted using Telemedicine with live, interactive video and audio.     SUBJECTIVE    Chief Complaint:  Concern for respiratory illness (including COVID-19 and influenza)    HPI: worsening  Push fluids, rest  Prone positioning reviewed  Deep breathng exercises  Okay for flonase, alternating acetaminphen and ibuprofen for body aches    SERGEY Harley advised to follow CDC guidelines for self isolation and symptomat

## 2022-01-10 ENCOUNTER — OFFICE VISIT (OUTPATIENT)
Dept: FAMILY MEDICINE CLINIC | Facility: CLINIC | Age: 52
End: 2022-01-10
Payer: COMMERCIAL

## 2022-01-10 ENCOUNTER — TELEPHONE (OUTPATIENT)
Dept: FAMILY MEDICINE CLINIC | Facility: CLINIC | Age: 52
End: 2022-01-10

## 2022-01-10 VITALS
DIASTOLIC BLOOD PRESSURE: 72 MMHG | HEIGHT: 67.25 IN | BODY MASS INDEX: 30.59 KG/M2 | OXYGEN SATURATION: 97 % | RESPIRATION RATE: 16 BRPM | TEMPERATURE: 98 F | WEIGHT: 197.19 LBS | HEART RATE: 64 BPM | SYSTOLIC BLOOD PRESSURE: 114 MMHG

## 2022-01-10 DIAGNOSIS — Z23 NEED FOR INFLUENZA VACCINATION: ICD-10-CM

## 2022-01-10 DIAGNOSIS — Z23 NEED FOR TDAP VACCINATION: ICD-10-CM

## 2022-01-10 DIAGNOSIS — E78.01 FAMILIAL HYPERCHOLESTEROLEMIA: ICD-10-CM

## 2022-01-10 DIAGNOSIS — Z12.5 PROSTATE CANCER SCREENING: ICD-10-CM

## 2022-01-10 DIAGNOSIS — Z13.29 THYROID DISORDER SCREEN: ICD-10-CM

## 2022-01-10 DIAGNOSIS — Z00.00 WELLNESS EXAMINATION: Primary | ICD-10-CM

## 2022-01-10 DIAGNOSIS — Z13.6 SCREENING FOR CARDIOVASCULAR CONDITION: ICD-10-CM

## 2022-01-10 DIAGNOSIS — Z23 NEED FOR SHINGLES VACCINE: ICD-10-CM

## 2022-01-10 DIAGNOSIS — I10 HTN (HYPERTENSION), BENIGN: ICD-10-CM

## 2022-01-10 DIAGNOSIS — Z13.0 SCREENING FOR DEFICIENCY ANEMIA: ICD-10-CM

## 2022-01-10 DIAGNOSIS — Z13.220 LIPID SCREENING: ICD-10-CM

## 2022-01-10 PROCEDURE — 90750 HZV VACC RECOMBINANT IM: CPT | Performed by: NURSE PRACTITIONER

## 2022-01-10 PROCEDURE — 90715 TDAP VACCINE 7 YRS/> IM: CPT | Performed by: NURSE PRACTITIONER

## 2022-01-10 PROCEDURE — 99396 PREV VISIT EST AGE 40-64: CPT | Performed by: NURSE PRACTITIONER

## 2022-01-10 PROCEDURE — 90686 IIV4 VACC NO PRSV 0.5 ML IM: CPT | Performed by: NURSE PRACTITIONER

## 2022-01-10 PROCEDURE — 93000 ELECTROCARDIOGRAM COMPLETE: CPT | Performed by: NURSE PRACTITIONER

## 2022-01-10 PROCEDURE — 3078F DIAST BP <80 MM HG: CPT | Performed by: NURSE PRACTITIONER

## 2022-01-10 PROCEDURE — 90471 IMMUNIZATION ADMIN: CPT | Performed by: NURSE PRACTITIONER

## 2022-01-10 PROCEDURE — 3074F SYST BP LT 130 MM HG: CPT | Performed by: NURSE PRACTITIONER

## 2022-01-10 PROCEDURE — G0438 PPPS, INITIAL VISIT: HCPCS | Performed by: NURSE PRACTITIONER

## 2022-01-10 PROCEDURE — 90472 IMMUNIZATION ADMIN EACH ADD: CPT | Performed by: NURSE PRACTITIONER

## 2022-01-10 PROCEDURE — 3008F BODY MASS INDEX DOCD: CPT | Performed by: NURSE PRACTITIONER

## 2022-01-10 RX ORDER — CLOTRIMAZOLE AND BETAMETHASONE DIPROPIONATE 10; .64 MG/G; MG/G
1 CREAM TOPICAL 2 TIMES DAILY PRN
Qty: 60 G | Refills: 0 | Status: SHIPPED | OUTPATIENT
Start: 2022-01-10

## 2022-01-10 NOTE — PROGRESS NOTES
HPI:   Tere Arnold is a 45year old male who presents for an Annual Health Visit. Present for annual wellness. Has a rash that doesn't itch to his upper chest and neck and back.  Has used a cream that he has used in the past.   Has not used his a 30.66 kg/m²    Wt Readings from Last 6 Encounters:  01/10/22 : 197 lb 3.2 oz (89.4 kg)  12/23/21 : 195 lb (88.5 kg)  03/02/21 : 198 lb 9.6 oz (90.1 kg)  08/18/20 : 197 lb 3.2 oz (89.4 kg)  06/05/20 : 196 lb (88.9 kg)  03/06/19 : 196 lb (88.9 kg)    Body ma visit:    Wellness examination  -     COMP METABOLIC PANEL (14); Future  -     CBC WITH DIFFERENTIAL WITH PLATELET; Future  -     LIPID PANEL;  Future  -     TSH W REFLEX TO FREE T4; Future  -     ELECTROCARDIOGRAM, COMPLETE    Lipid screening  -     LIPID

## 2022-01-10 NOTE — ASSESSMENT & PLAN NOTE
Stable - labs pending.      Lipids and AST/ALT  (most recent labs)   Lab Results   Component Value Date    CHOLEST 223 (H) 06/01/2020    TRIG 99 06/01/2020    HDL 29 (L) 06/01/2020     (H) 06/01/2020    NONHDLC 194 (H) 06/01/2020    AST 17 06/01/2020

## 2022-01-10 NOTE — PATIENT INSTRUCTIONS
Check on the cream for the rash. Schedule appointment for GI for colonoscopy. .     Schedule appointment for fasting labs. Tetanus, shingles and flu vaccines today. Return in 2 - 6 months for shingles #2.      EKG: normal    Follow up annually and as

## 2022-01-10 NOTE — TELEPHONE ENCOUNTER
Pt calling after seeing Ioana Weberlatricia today and states she needed this info to refill a cream for him. Clotrimazole and Betamethasone Dipropionate  - Topical cream 1%-.05% Base. No instructions listed on container.     Please advise on refill and sent to [facility]

## 2022-01-17 ENCOUNTER — TELEPHONE (OUTPATIENT)
Dept: FAMILY MEDICINE CLINIC | Facility: CLINIC | Age: 52
End: 2022-01-17

## 2022-01-17 NOTE — TELEPHONE ENCOUNTER
Pt has an appt tomorrow, states that he had a fever last week Tues and Wed. No sxs now, ok to keep tomorrow's appt?      Your appointments     Date & Time Appointment Department [facility])    Jan 18, 2022  8:45 AM CST Laboratory Visit with [facility] NURSE

## 2022-01-18 ENCOUNTER — LABORATORY ENCOUNTER (OUTPATIENT)
Dept: LAB | Age: 52
End: 2022-01-18
Attending: FAMILY MEDICINE
Payer: COMMERCIAL

## 2022-01-18 DIAGNOSIS — Z13.220 LIPID SCREENING: ICD-10-CM

## 2022-01-18 DIAGNOSIS — Z13.0 SCREENING FOR DEFICIENCY ANEMIA: ICD-10-CM

## 2022-01-18 DIAGNOSIS — Z13.29 THYROID DISORDER SCREEN: ICD-10-CM

## 2022-01-18 DIAGNOSIS — Z00.00 WELLNESS EXAMINATION: ICD-10-CM

## 2022-01-18 DIAGNOSIS — Z12.5 PROSTATE CANCER SCREENING: ICD-10-CM

## 2022-01-18 LAB
ALBUMIN SERPL-MCNC: 3.7 G/DL (ref 3.4–5)
ALBUMIN/GLOB SERPL: 1.1 {RATIO} (ref 1–2)
ALP LIVER SERPL-CCNC: 61 U/L
ALT SERPL-CCNC: 35 U/L
ANION GAP SERPL CALC-SCNC: 5 MMOL/L (ref 0–18)
AST SERPL-CCNC: 18 U/L (ref 15–37)
BASOPHILS # BLD AUTO: 0.1 X10(3) UL (ref 0–0.2)
BASOPHILS NFR BLD AUTO: 1.1 %
BILIRUB SERPL-MCNC: 0.4 MG/DL (ref 0.1–2)
BUN BLD-MCNC: 12 MG/DL (ref 7–18)
CALCIUM BLD-MCNC: 8.8 MG/DL (ref 8.5–10.1)
CHLORIDE SERPL-SCNC: 107 MMOL/L (ref 98–112)
CHOLEST SERPL-MCNC: 239 MG/DL (ref ?–200)
CO2 SERPL-SCNC: 26 MMOL/L (ref 21–32)
COMPLEXED PSA SERPL-MCNC: 3.09 NG/ML (ref ?–4)
CREAT BLD-MCNC: 0.82 MG/DL
EOSINOPHIL # BLD AUTO: 0.6 X10(3) UL (ref 0–0.7)
EOSINOPHIL NFR BLD AUTO: 6.4 %
ERYTHROCYTE [DISTWIDTH] IN BLOOD BY AUTOMATED COUNT: 12.3 %
FASTING PATIENT LIPID ANSWER: YES
FASTING STATUS PATIENT QL REPORTED: YES
GLOBULIN PLAS-MCNC: 3.4 G/DL (ref 2.8–4.4)
GLUCOSE BLD-MCNC: 101 MG/DL (ref 70–99)
HCT VFR BLD AUTO: 42.9 %
HDLC SERPL-MCNC: 34 MG/DL (ref 40–59)
HGB BLD-MCNC: 14.5 G/DL
IMM GRANULOCYTES # BLD AUTO: 0.12 X10(3) UL (ref 0–1)
IMM GRANULOCYTES NFR BLD: 1.3 %
LDLC SERPL CALC-MCNC: 183 MG/DL (ref ?–100)
LYMPHOCYTES # BLD AUTO: 2.52 X10(3) UL (ref 1–4)
LYMPHOCYTES NFR BLD AUTO: 26.8 %
MCH RBC QN AUTO: 29.9 PG (ref 26–34)
MCHC RBC AUTO-ENTMCNC: 33.8 G/DL (ref 31–37)
MCV RBC AUTO: 88.5 FL
MONOCYTES # BLD AUTO: 0.74 X10(3) UL (ref 0.1–1)
MONOCYTES NFR BLD AUTO: 7.9 %
NEUTROPHILS # BLD AUTO: 5.34 X10 (3) UL (ref 1.5–7.7)
NEUTROPHILS # BLD AUTO: 5.34 X10(3) UL (ref 1.5–7.7)
NEUTROPHILS NFR BLD AUTO: 56.5 %
NONHDLC SERPL-MCNC: 205 MG/DL (ref ?–130)
OSMOLALITY SERPL CALC.SUM OF ELEC: 286 MOSM/KG (ref 275–295)
PLATELET # BLD AUTO: 313 10(3)UL (ref 150–450)
POTASSIUM SERPL-SCNC: 4 MMOL/L (ref 3.5–5.1)
PROT SERPL-MCNC: 7.1 G/DL (ref 6.4–8.2)
RBC # BLD AUTO: 4.85 X10(6)UL
SODIUM SERPL-SCNC: 138 MMOL/L (ref 136–145)
TRIGL SERPL-MCNC: 121 MG/DL (ref 30–149)
TSI SER-ACNC: 2 MIU/ML (ref 0.36–3.74)
VLDLC SERPL CALC-MCNC: 25 MG/DL (ref 0–30)
WBC # BLD AUTO: 9.4 X10(3) UL (ref 4–11)

## 2022-01-18 PROCEDURE — 36415 COLL VENOUS BLD VENIPUNCTURE: CPT

## 2022-01-18 PROCEDURE — 80053 COMPREHEN METABOLIC PANEL: CPT

## 2022-01-18 PROCEDURE — 84443 ASSAY THYROID STIM HORMONE: CPT

## 2022-01-18 PROCEDURE — 85025 COMPLETE CBC W/AUTO DIFF WBC: CPT

## 2022-01-18 PROCEDURE — 80061 LIPID PANEL: CPT

## 2022-01-19 ENCOUNTER — TELEPHONE (OUTPATIENT)
Dept: FAMILY MEDICINE CLINIC | Facility: CLINIC | Age: 52
End: 2022-01-19

## 2022-01-19 NOTE — TELEPHONE ENCOUNTER
----- Message from SERGEY Swan sent at 1/19/2022 10:52 AM CST -----  Please let patient know that his fasting blood sugar is slightly elevated.  Recommend to limit the carbs and sugars in diet.   In order to lower your risk of cardiovascular disea

## 2022-06-13 RX ORDER — LISINOPRIL 5 MG/1
TABLET ORAL
Qty: 90 TABLET | Refills: 1 | Status: SHIPPED | OUTPATIENT
Start: 2022-06-13

## 2022-06-13 NOTE — TELEPHONE ENCOUNTER
Lisinopril: 12/17/21    Future appt:    Last Appointment with provider:   Visit date not found  Last appointment at [facility] [place]:  1/10/2022 Kati BARCLAY/SERGEY--Wellness    Cholesterol, Total (mg/dL)   Date Value   01/18/2022 239 (H)     HDL Cholesterol (mg/dL)   Date Value   01/18/2022 34 (L)     LDL Cholesterol (mg/dL)   Date Value   01/18/2022 183 (H)     Triglycerides (mg/dL)   Date Value   01/18/2022 121     No results found for: EAG, A1C  Lab Results   Component Value Date    TSH 2.000 01/18/2022       No follow-ups on file.

## 2022-07-28 NOTE — ASSESSMENT & PLAN NOTE
HTN is stable. Continue current medications. Recheck every 6 months.      HTN labs   Lab Results   Component Value Date    BUN 13 06/01/2020    CREATSERUM 0.79 06/01/2020     06/01/2020    K 4.0 06/01/2020         Blood Pressure and Cardiac Medicati Doxycycline Counseling:  Patient counseled regarding possible photosensitivity and increased risk for sunburn.  Patient instructed to avoid sunlight, if possible.  When exposed to sunlight, patients should wear protective clothing, sunglasses, and sunscreen.  The patient was instructed to call the office immediately if the following severe adverse effects occur:  hearing changes, easy bruising/bleeding, severe headache, or vision changes.  The patient verbalized understanding of the proper use and possible adverse effects of doxycycline.  All of the patient's questions and concerns were addressed.

## 2022-12-10 NOTE — TELEPHONE ENCOUNTER
Tried calling to schedule appt, pt did not answer and does not allow messages on voicemail. Last refill 6/13/22 #[id number] with 1 refill       Future appt:    Last Appointment with provider:   Visit date not found  [place] [facility]:  Visit date not found  Cholesterol, Total (mg/dL)   Date Value   01/18/2022 239 (H)     HDL Cholesterol (mg/dL)   Date Value   01/18/2022 34 (L)     LDL Cholesterol (mg/dL)   Date Value   01/18/2022 183 (H)     Triglycerides (mg/dL)   Date Value   01/18/2022 121     No results found for: EAG, A1C  Lab Results   Component Value Date    TSH 2.000 01/18/2022       No follow-ups on file.

## 2022-12-12 RX ORDER — LISINOPRIL 5 MG/1
TABLET ORAL
Qty: 90 TABLET | Refills: 0 | Status: SHIPPED | OUTPATIENT
Start: 2022-12-12

## 2022-12-12 NOTE — TELEPHONE ENCOUNTER
Lisinopril: 6/13/22    Future appt:    Last Appointment with provider:   Visit date not found  Last appointment at [facility]:   1/10/22  Kati APRN/ Wellness    Cholesterol, Total (mg/dL)   Date Value   01/18/2022 239 (H)     HDL Cholesterol (mg/dL)   Date Value   01/18/2022 34 (L)     LDL Cholesterol (mg/dL)   Date Value   01/18/2022 183 (H)     Triglycerides (mg/dL)   Date Value   01/18/2022 121     No results found for: EAG, A1C  Lab Results   Component Value Date    TSH 2.000 01/18/2022       No follow-ups on file.

## 2023-01-11 ENCOUNTER — LABORATORY ENCOUNTER (OUTPATIENT)
Dept: LAB | Age: 53
End: 2023-01-11
Payer: COMMERCIAL

## 2023-01-11 ENCOUNTER — OFFICE VISIT (OUTPATIENT)
Dept: FAMILY MEDICINE CLINIC | Facility: CLINIC | Age: 53
End: 2023-01-11
Payer: COMMERCIAL

## 2023-01-11 VITALS
RESPIRATION RATE: 18 BRPM | HEART RATE: 72 BPM | HEIGHT: 68.5 IN | DIASTOLIC BLOOD PRESSURE: 64 MMHG | TEMPERATURE: 97 F | BODY MASS INDEX: 28.91 KG/M2 | OXYGEN SATURATION: 97 % | SYSTOLIC BLOOD PRESSURE: 104 MMHG | WEIGHT: 193 LBS

## 2023-01-11 DIAGNOSIS — Z23 ENCOUNTER FOR IMMUNIZATION: ICD-10-CM

## 2023-01-11 DIAGNOSIS — M25.551 RIGHT HIP PAIN: ICD-10-CM

## 2023-01-11 DIAGNOSIS — Z12.11 ENCOUNTER FOR SCREENING COLONOSCOPY: ICD-10-CM

## 2023-01-11 DIAGNOSIS — M25.511 CHRONIC RIGHT SHOULDER PAIN: ICD-10-CM

## 2023-01-11 DIAGNOSIS — Z00.00 ANNUAL PHYSICAL EXAM: ICD-10-CM

## 2023-01-11 DIAGNOSIS — Z00.00 ANNUAL PHYSICAL EXAM: Primary | ICD-10-CM

## 2023-01-11 DIAGNOSIS — I10 HTN (HYPERTENSION), BENIGN: ICD-10-CM

## 2023-01-11 DIAGNOSIS — G89.29 CHRONIC RIGHT SHOULDER PAIN: ICD-10-CM

## 2023-01-11 LAB
ALBUMIN SERPL-MCNC: 3.6 G/DL (ref 3.4–5)
ALBUMIN/GLOB SERPL: 0.9 {RATIO} (ref 1–2)
ALP LIVER SERPL-CCNC: 65 U/L
ALT SERPL-CCNC: 24 U/L
ANION GAP SERPL CALC-SCNC: 6 MMOL/L (ref 0–18)
AST SERPL-CCNC: 19 U/L (ref 15–37)
BASOPHILS # BLD AUTO: 0.07 X10(3) UL (ref 0–0.2)
BASOPHILS NFR BLD AUTO: 0.7 %
BILIRUB SERPL-MCNC: 0.4 MG/DL (ref 0.1–2)
BILIRUB UR QL STRIP.AUTO: NEGATIVE
BUN BLD-MCNC: 14 MG/DL (ref 7–18)
CALCIUM BLD-MCNC: 9.3 MG/DL (ref 8.5–10.1)
CHLORIDE SERPL-SCNC: 107 MMOL/L (ref 98–112)
CHOLEST SERPL-MCNC: 231 MG/DL (ref ?–200)
CLARITY UR REFRACT.AUTO: CLEAR
CO2 SERPL-SCNC: 26 MMOL/L (ref 21–32)
COLOR UR AUTO: YELLOW
CREAT BLD-MCNC: 0.86 MG/DL
EOSINOPHIL # BLD AUTO: 0.51 X10(3) UL (ref 0–0.7)
EOSINOPHIL NFR BLD AUTO: 5 %
ERYTHROCYTE [DISTWIDTH] IN BLOOD BY AUTOMATED COUNT: 12.7 %
EST. AVERAGE GLUCOSE BLD GHB EST-MCNC: 111 MG/DL (ref 68–126)
FASTING PATIENT LIPID ANSWER: YES
FASTING STATUS PATIENT QL REPORTED: YES
GFR SERPLBLD BASED ON 1.73 SQ M-ARVRAT: 104 ML/MIN/1.73M2 (ref 60–?)
GLOBULIN PLAS-MCNC: 4.1 G/DL (ref 2.8–4.4)
GLUCOSE BLD-MCNC: 90 MG/DL (ref 70–99)
GLUCOSE UR STRIP.AUTO-MCNC: NEGATIVE MG/DL
HBA1C MFR BLD: 5.5 % (ref ?–5.7)
HCT VFR BLD AUTO: 44.3 %
HDLC SERPL-MCNC: 39 MG/DL (ref 40–59)
HGB BLD-MCNC: 15.2 G/DL
IMM GRANULOCYTES # BLD AUTO: 0.11 X10(3) UL (ref 0–1)
IMM GRANULOCYTES NFR BLD: 1.1 %
KETONES UR STRIP.AUTO-MCNC: NEGATIVE MG/DL
LDLC SERPL CALC-MCNC: 176 MG/DL (ref ?–100)
LEUKOCYTE ESTERASE UR QL STRIP.AUTO: NEGATIVE
LYMPHOCYTES # BLD AUTO: 2.73 X10(3) UL (ref 1–4)
LYMPHOCYTES NFR BLD AUTO: 26.8 %
MCH RBC QN AUTO: 30.3 PG (ref 26–34)
MCHC RBC AUTO-ENTMCNC: 34.3 G/DL (ref 31–37)
MCV RBC AUTO: 88.2 FL
MONOCYTES # BLD AUTO: 0.87 X10(3) UL (ref 0.1–1)
MONOCYTES NFR BLD AUTO: 8.5 %
NEUTROPHILS # BLD AUTO: 5.9 X10 (3) UL (ref 1.5–7.7)
NEUTROPHILS # BLD AUTO: 5.9 X10(3) UL (ref 1.5–7.7)
NEUTROPHILS NFR BLD AUTO: 57.9 %
NITRITE UR QL STRIP.AUTO: NEGATIVE
NONHDLC SERPL-MCNC: 192 MG/DL (ref ?–130)
OSMOLALITY SERPL CALC.SUM OF ELEC: 288 MOSM/KG (ref 275–295)
PH UR STRIP.AUTO: 6 [PH] (ref 5–8)
PLATELET # BLD AUTO: 319 10(3)UL (ref 150–450)
POTASSIUM SERPL-SCNC: 4.5 MMOL/L (ref 3.5–5.1)
PROT SERPL-MCNC: 7.7 G/DL (ref 6.4–8.2)
PROT UR STRIP.AUTO-MCNC: NEGATIVE MG/DL
RBC # BLD AUTO: 5.02 X10(6)UL
RBC UR QL AUTO: NEGATIVE
SODIUM SERPL-SCNC: 139 MMOL/L (ref 136–145)
SP GR UR STRIP.AUTO: 1.02 (ref 1–1.03)
TRIGL SERPL-MCNC: 90 MG/DL (ref 30–149)
TSI SER-ACNC: 1.94 MIU/ML (ref 0.36–3.74)
UROBILINOGEN UR STRIP.AUTO-MCNC: <2 MG/DL
VLDLC SERPL CALC-MCNC: 18 MG/DL (ref 0–30)
WBC # BLD AUTO: 10.2 X10(3) UL (ref 4–11)

## 2023-01-11 PROCEDURE — 99396 PREV VISIT EST AGE 40-64: CPT

## 2023-01-11 PROCEDURE — 90471 IMMUNIZATION ADMIN: CPT

## 2023-01-11 PROCEDURE — 3074F SYST BP LT 130 MM HG: CPT

## 2023-01-11 PROCEDURE — 80053 COMPREHEN METABOLIC PANEL: CPT

## 2023-01-11 PROCEDURE — 81003 URINALYSIS AUTO W/O SCOPE: CPT

## 2023-01-11 PROCEDURE — 36415 COLL VENOUS BLD VENIPUNCTURE: CPT

## 2023-01-11 PROCEDURE — 3008F BODY MASS INDEX DOCD: CPT

## 2023-01-11 PROCEDURE — G0438 PPPS, INITIAL VISIT: HCPCS

## 2023-01-11 PROCEDURE — 83036 HEMOGLOBIN GLYCOSYLATED A1C: CPT

## 2023-01-11 PROCEDURE — 84443 ASSAY THYROID STIM HORMONE: CPT

## 2023-01-11 PROCEDURE — 90750 HZV VACC RECOMBINANT IM: CPT

## 2023-01-11 PROCEDURE — 3078F DIAST BP <80 MM HG: CPT

## 2023-01-11 PROCEDURE — 85025 COMPLETE CBC W/AUTO DIFF WBC: CPT

## 2023-01-11 PROCEDURE — 80061 LIPID PANEL: CPT

## 2023-01-11 RX ORDER — ZOSTER VACCINE RECOMBINANT, ADJUVANTED 50 MCG/0.5
50 KIT INTRAMUSCULAR ONCE
Qty: 1 EACH | Refills: 1 | Status: SHIPPED | OUTPATIENT
Start: 2023-01-11 | End: 2023-01-11 | Stop reason: CLARIF

## 2023-01-11 RX ORDER — LISINOPRIL 5 MG/1
5 TABLET ORAL DAILY
Qty: 90 TABLET | Refills: 3 | Status: SHIPPED | OUTPATIENT
Start: 2023-01-11

## 2023-01-11 RX ORDER — LISINOPRIL 5 MG/1
5 TABLET ORAL DAILY
Qty: 90 TABLET | Refills: 0 | Status: SHIPPED | OUTPATIENT
Start: 2023-01-11 | End: 2023-01-11

## 2023-01-18 ENCOUNTER — TELEPHONE (OUTPATIENT)
Dept: FAMILY MEDICINE CLINIC | Facility: CLINIC | Age: 53
End: 2023-01-18

## 2023-01-18 DIAGNOSIS — G89.29 CHRONIC RIGHT SHOULDER PAIN: ICD-10-CM

## 2023-01-18 DIAGNOSIS — M25.511 CHRONIC RIGHT SHOULDER PAIN: ICD-10-CM

## 2023-01-18 DIAGNOSIS — M25.551 RIGHT HIP PAIN: Primary | ICD-10-CM

## 2023-01-18 NOTE — TELEPHONE ENCOUNTER
Informed pt of new referral for physical therapy. Pt verbalizes understanding, no questions or concerns at this time.

## 2023-01-26 LAB — AMB EXT COLOGUARD RESULT: NEGATIVE

## 2023-02-07 ENCOUNTER — TELEPHONE (OUTPATIENT)
Dept: FAMILY MEDICINE CLINIC | Facility: CLINIC | Age: 53
End: 2023-02-07

## 2023-02-07 NOTE — TELEPHONE ENCOUNTER
Patient's Cologuard result is negative. A negative Cologuard test result indicates a low likelihood that he colorectal cancer is present. The current Cologuard screening intervals every 3 years recommend Cologuard in 3 years.

## 2023-08-08 ENCOUNTER — TELEPHONE (OUTPATIENT)
Dept: FAMILY MEDICINE CLINIC | Facility: CLINIC | Age: 53
End: 2023-08-08

## 2023-08-08 DIAGNOSIS — M25.551 RIGHT HIP PAIN: ICD-10-CM

## 2023-08-08 DIAGNOSIS — G89.29 CHRONIC RIGHT SHOULDER PAIN: Primary | ICD-10-CM

## 2023-08-08 DIAGNOSIS — M25.511 CHRONIC RIGHT SHOULDER PAIN: Primary | ICD-10-CM

## 2023-08-08 NOTE — TELEPHONE ENCOUNTER
Was given a referral for PT, 1/23 thru 3/23, never had PT. Patient is now looking for a referral for PT. Please advise patient   No future appointments.

## 2023-08-22 ENCOUNTER — TELEPHONE (OUTPATIENT)
Dept: FAMILY MEDICINE CLINIC | Facility: CLINIC | Age: 53
End: 2023-08-22

## 2023-08-22 NOTE — TELEPHONE ENCOUNTER
He was sent for PT, wondering which location? I told him if he has our HMO site, has to be [place]. Please give him a call back  No future appointments.

## 2023-08-22 NOTE — TELEPHONE ENCOUNTER
[facility]  [address] [place] [address]  YUMIKO Arndt/Too Lockett [address]    Referral information given to patient to call and schedule an appt.

## 2023-09-11 ENCOUNTER — TELEPHONE (OUTPATIENT)
Dept: PHYSICAL THERAPY | Facility: HOSPITAL | Age: 53
End: 2023-09-11

## 2023-09-18 ENCOUNTER — OFFICE VISIT (OUTPATIENT)
Dept: PHYSICAL THERAPY | Age: 53
End: 2023-09-18
Payer: COMMERCIAL

## 2023-09-18 ENCOUNTER — TELEPHONE (OUTPATIENT)
Dept: FAMILY MEDICINE CLINIC | Facility: CLINIC | Age: 53
End: 2023-09-18

## 2023-09-18 DIAGNOSIS — M54.50 ACUTE MIDLINE LOW BACK PAIN WITHOUT SCIATICA: Primary | ICD-10-CM

## 2023-09-18 DIAGNOSIS — M25.511 CHRONIC RIGHT SHOULDER PAIN: Primary | ICD-10-CM

## 2023-09-18 DIAGNOSIS — G89.29 CHRONIC RIGHT SHOULDER PAIN: Primary | ICD-10-CM

## 2023-09-18 DIAGNOSIS — M25.551 RIGHT HIP PAIN: ICD-10-CM

## 2023-09-18 PROCEDURE — 97162 PT EVAL MOD COMPLEX 30 MIN: CPT

## 2023-09-18 PROCEDURE — 97110 THERAPEUTIC EXERCISES: CPT

## 2023-09-18 NOTE — TELEPHONE ENCOUNTER
Pt states he was seen in the ED last week due to acute back pain. Pt states she was told to go see a neurosurgeon. Pt stated he needs a referral.    Pt informed appt is needed for any referral orders. Post ER visit is scheduled with ALISTAIR Soria this week on 9/20/23.     Future Appointments   Date Time Provider Celestine Greene   9/20/2023  7:30 AM SERGEY Womack [facility]   9/22/2023  8:30 AM Cheryl [place], PT [facility] [place]   9/26/2023  7:45 AM Cheryl [place], PT [facility] [place]   9/28/2023  8:15 AM Cheryl [place], PT [facility] [place]   10/2/2023  7:30 AM Cheryl [place], PT [facility] [place]   10/5/2023  7:30 AM Cheryl [place], PT [facility] [place]   10/9/2023  7:30 AM Cheryl [place], PT [facility]

## 2023-09-19 NOTE — TELEPHONE ENCOUNTER
I will send in a referral to Dr Suzanne Westbrook, back specialist at [facility].

## 2023-09-20 ENCOUNTER — LAB ENCOUNTER (OUTPATIENT)
Dept: LAB | Age: 53
End: 2023-09-20
Payer: COMMERCIAL

## 2023-09-20 ENCOUNTER — OFFICE VISIT (OUTPATIENT)
Dept: FAMILY MEDICINE CLINIC | Facility: CLINIC | Age: 53
End: 2023-09-20
Payer: COMMERCIAL

## 2023-09-20 VITALS
TEMPERATURE: 98 F | BODY MASS INDEX: 30.01 KG/M2 | DIASTOLIC BLOOD PRESSURE: 82 MMHG | RESPIRATION RATE: 16 BRPM | SYSTOLIC BLOOD PRESSURE: 142 MMHG | HEART RATE: 64 BPM | WEIGHT: 198 LBS | HEIGHT: 68 IN

## 2023-09-20 DIAGNOSIS — M54.50 LUMBAR PAIN: Primary | ICD-10-CM

## 2023-09-20 DIAGNOSIS — Z79.899 MEDICATION MANAGEMENT: ICD-10-CM

## 2023-09-20 DIAGNOSIS — M54.50 LUMBAR PAIN: ICD-10-CM

## 2023-09-20 LAB
ALBUMIN SERPL-MCNC: 3.6 G/DL (ref 3.4–5)
ALBUMIN/GLOB SERPL: 1 {RATIO} (ref 1–2)
ALP LIVER SERPL-CCNC: 64 U/L
ALT SERPL-CCNC: 31 U/L
ANION GAP SERPL CALC-SCNC: 2 MMOL/L (ref 0–18)
AST SERPL-CCNC: 22 U/L (ref 15–37)
BILIRUB SERPL-MCNC: 0.3 MG/DL (ref 0.1–2)
BUN BLD-MCNC: 18 MG/DL (ref 7–18)
CALCIUM BLD-MCNC: 8.9 MG/DL (ref 8.5–10.1)
CHLORIDE SERPL-SCNC: 108 MMOL/L (ref 98–112)
CO2 SERPL-SCNC: 27 MMOL/L (ref 21–32)
CREAT BLD-MCNC: 0.93 MG/DL
EGFRCR SERPLBLD CKD-EPI 2021: 98 ML/MIN/1.73M2 (ref 60–?)
FASTING STATUS PATIENT QL REPORTED: NO
GLOBULIN PLAS-MCNC: 3.5 G/DL (ref 2.8–4.4)
GLUCOSE BLD-MCNC: 110 MG/DL (ref 70–99)
OSMOLALITY SERPL CALC.SUM OF ELEC: 287 MOSM/KG (ref 275–295)
POTASSIUM SERPL-SCNC: 4.1 MMOL/L (ref 3.5–5.1)
PROT SERPL-MCNC: 7.1 G/DL (ref 6.4–8.2)
SODIUM SERPL-SCNC: 137 MMOL/L (ref 136–145)

## 2023-09-20 PROCEDURE — 99214 OFFICE O/P EST MOD 30 MIN: CPT

## 2023-09-20 PROCEDURE — 3008F BODY MASS INDEX DOCD: CPT

## 2023-09-20 PROCEDURE — 3077F SYST BP >= 140 MM HG: CPT

## 2023-09-20 PROCEDURE — 36415 COLL VENOUS BLD VENIPUNCTURE: CPT

## 2023-09-20 PROCEDURE — 3079F DIAST BP 80-89 MM HG: CPT

## 2023-09-20 PROCEDURE — 80053 COMPREHEN METABOLIC PANEL: CPT

## 2023-09-20 RX ORDER — KETOROLAC TROMETHAMINE 10 MG/1
1 TABLET, FILM COATED ORAL EVERY 6 HOURS PRN
COMMUNITY
Start: 2023-09-12

## 2023-09-20 RX ORDER — HYDROCODONE BITARTRATE AND ACETAMINOPHEN 10; 325 MG/1; MG/1
1 TABLET ORAL EVERY 6 HOURS PRN
COMMUNITY
Start: 2023-09-12

## 2023-09-22 ENCOUNTER — OFFICE VISIT (OUTPATIENT)
Dept: PHYSICAL THERAPY | Age: 53
End: 2023-09-22
Payer: COMMERCIAL

## 2023-09-22 PROCEDURE — 97110 THERAPEUTIC EXERCISES: CPT

## 2023-09-22 PROCEDURE — 97140 MANUAL THERAPY 1/> REGIONS: CPT

## 2023-09-22 NOTE — PROGRESS NOTES
Diagnosis:   Low back pain   Lumbar radiculopathy M54.16   Chronic right shoulder pain (M25.511,G89.29)  Right hip pain (M25.551)      Referring Provider: Rex Zuñiga  Date of Evaluation:    9/18/23    Precautions:  None Next MD visit:   9/26/23 Dr. Johnna Johnson in PM&R  Date of Surgery: n/a   Insurance Primary/Secondary: [address] [place], [place] [address]O / N/A     # Auth Visits: 5 visits             Subjective: Pt saw MD on 9/20/23 and received referral to see spine specialist. He will follow next week. He reports pain at his tailbone this AM, feeling tight and then took meds and felt improvement. Pain: 7/10 shooting pain tailbone area      Objective:   9/22/23- hypertonic L thoracolumbar paraspinals Sijt rotation R tilt      Assessment: Education on irritability of neural tissue and avoiding exacerbation. Ed on anatomy, piriromfirs and sciatic nerve. SI Jt rotation R, prominent L side through T spine, mobs on Tps tolerated well.       Goals:   (to be met in 8 visits)  Pt will improve hip ABD and ER strength to 5/5 to increase ease with standing and walking   Pt will be able to squat to  light objects around the house with <3/10 pain   Pt will improve functional hip strength to report ability to ascend/descend 1 flight of stairs reciprocally without use of handrail   Pt will improve transversus abdominis recruitment to perform proper isometric contraction without requiring verbal or tactile cuing to promote advancement of therex   Pt will demonstrate good understanding of proper posture and body mechanics to decrease pain and improve spinal safety   Pt will report improved symptom centralization and absence of radicular symptoms for 3 consecutive days to improve function with ADL   Pt will have decreased paraspinal mm tension to tolerate standing >30 minutes for work and home activities   Pt will demonstrate improved core strength to be able to perform dead bug with </10 pain   Pt will demonstrate improved SLS to >10 seconds QUINN to promote safety and decrease risk of falls on uneven surfaces such as grass and gravel   Pt will be independent and compliant with comprehensive HEP to maintain progress achieved in PT     Plan: Address pain management, positioning for comfort, ROM as able along with core bracing to facilitate return to PLOF. Date: 9/22/2023  TX#: 2/5 Date:                 TX#: 3/ Date:                 TX#: 4/ Date:                 TX#: 5/ Date:    Tx#: 6/   THERE EX:  Manual glut, piriformis stretch 30 secs 3x eahc LE  Figure 4 and corss body piriformis stretch 30 secs 3x each *  SL clams 10x3 each*  Sciatic nerve flossing manual supine 10x2 RLE         MANUAL:  Prone Stm B thoracolumbar paraspianls into R QL and glut 15 mins  Prone P T-L spine 5 mins grade 3-4  Prone PA SI L sided to reduce right rot grade 3-4 3 mins                     HEP: Denoted with * Has offloading coccyx cushions, and folded pillow for prolonged sitting or travel, seated sciatic nerve flossing RLE 10x2 daily, sitting lumbar flexion stretch 30 secs 3x  from eval    Charges: 2 manual 1 ther ex       Total Timed Treatment: 39 min  Total Treatment Time: 45 min

## 2023-09-26 ENCOUNTER — OFFICE VISIT (OUTPATIENT)
Dept: PHYSICAL THERAPY | Age: 53
End: 2023-09-26
Payer: COMMERCIAL

## 2023-09-26 PROCEDURE — 97110 THERAPEUTIC EXERCISES: CPT

## 2023-09-26 PROCEDURE — 97140 MANUAL THERAPY 1/> REGIONS: CPT

## 2023-09-26 NOTE — PROGRESS NOTES
Diagnosis:   Low back pain   Lumbar radiculopathy M54.16   Chronic right shoulder pain (M25.511,G89.29)  Right hip pain (M25.551)      Referring Provider: Sowmya Mooney  Date of Evaluation:    9/18/23    Precautions:  None Next MD visit:   9/26/23 Dr. Lance Pettit in PM&R  Date of Surgery: n/a   Insurance Primary/Secondary: [address] [place] [address] / N/A     # Auth Visits: 5 visits             Subjective: Pt states he is feeling better, completed stretches at home and is feeling tight but pain is less. He states he fell over a ladder at work yesterday scaping his R calf and feeling a large knot there. Pain: 2/10 low back pain      Objective:   9/26/23- more level SI-  TTP R piriformis  9/22/23- hypertonic L thoracolumbar paraspinals Sijt rotation R tilt      Assessment: Improving pain reports, pt tolerates more ther ex this visit. Tailbone pain remains most limiting factor, but tolerating gentle AROM with segmental bridges, Added later hip ABD with band and TA brace for spinal stability.  Cues for breath support      Goals:   (to be met in 8 visits)  Pt will improve hip ABD and ER strength to 5/5 to increase ease with standing and walking   Pt will be able to squat to  light objects around the house with <3/10 pain   Pt will improve functional hip strength to report ability to ascend/descend 1 flight of stairs reciprocally without use of handrail   Pt will improve transversus abdominis recruitment to perform proper isometric contraction without requiring verbal or tactile cuing to promote advancement of therex   Pt will demonstrate good understanding of proper posture and body mechanics to decrease pain and improve spinal safety   Pt will report improved symptom centralization and absence of radicular symptoms for 3 consecutive days to improve function with ADL   Pt will have decreased paraspinal mm tension to tolerate standing >30 minutes for work and home activities   Pt will demonstrate improved core strength to be able to perform dead bug with </10 pain   Pt will demonstrate improved SLS to >10 seconds QUINN to promote safety and decrease risk of falls on uneven surfaces such as grass and gravel   Pt will be independent and compliant with comprehensive HEP to maintain progress achieved in PT     Plan: Address pain management, positioning for comfort, ROM as able along with core bracing to facilitate return to PLOF. Date: 9/22/2023  TX#: 2/5 Date: 9/26/23                TX#: 3/5 Date:                 TX#: 4/ Date:                 TX#: 5/ Date:    Tx#: 6/   THERE EX:  Manual glut, piriformis stretch 30 secs 3x eahc LE  Figure 4 and corss body piriformis stretch 30 secs 3x each *  SL clams 10x3 each*  Sciatic nerve flossing manual supine 10x2 RLE   THERE EX:  Warm up Treadmill walking cues for arm swing 5 mins 2 mph lack of lumbar rotation noted, more lateral flexion - co in R hip  Standing gastroc incline board and soleus stretch 30 secs 3x RLE  PPT progressing to segemental bridge 10x*  Manual glut, piriformis stretch 30 secs 3x eahc LE  Blue band hip ABD with TA brace 10x2      MANUAL:  Prone Stm B thoracolumbar paraspianls into R QL and glut 15 mins  Prone P T-L spine 5 mins grade 3-4  Prone PA SI L sided to reduce right rot grade 3-4 3 mins MANUAL:  Prone Stm B thoracolumbar paraspianls into R QL and glut 12 mins  Prone P T-L spine 5 mins grade 3-4  Prone PA SI L sided to reduce right rot grade 3-4 3 mins  Prone press up to hands 5x 5' 3x with PT OP                    HEP: Denoted with * Has offloading coccyx cushions, and folded pillow for prolonged sitting or travel, seated sciatic nerve flossing RLE 10x2 daily, sitting lumbar flexion stretch 30 secs 3x  from eval    Charges: 1 manual 2 ther ex       Total Timed Treatment: 45 min  Total Treatment Time: 45 min

## 2023-09-27 ENCOUNTER — TELEPHONE (OUTPATIENT)
Dept: FAMILY MEDICINE CLINIC | Facility: CLINIC | Age: 53
End: 2023-09-27

## 2023-09-27 RX ORDER — MELOXICAM 7.5 MG/1
7.5 TABLET ORAL EVERY 12 HOURS PRN
Qty: 30 TABLET | Refills: 1 | Status: SHIPPED | OUTPATIENT
Start: 2023-09-27

## 2023-09-27 NOTE — TELEPHONE ENCOUNTER
Needs rx for meloxicam- stated it was discussed at last visit and regular OTC meds are not helping - [facility]

## 2023-09-28 ENCOUNTER — OFFICE VISIT (OUTPATIENT)
Dept: PHYSICAL THERAPY | Age: 53
End: 2023-09-28
Payer: COMMERCIAL

## 2023-09-28 PROCEDURE — 97140 MANUAL THERAPY 1/> REGIONS: CPT

## 2023-09-28 PROCEDURE — 97110 THERAPEUTIC EXERCISES: CPT

## 2023-09-28 NOTE — PROGRESS NOTES
Diagnosis:   Low back pain   Lumbar radiculopathy M54.16   Chronic right shoulder pain (M25.511,G89.29)  Right hip pain (M25.551)      Referring Provider: Linnea Pedroza  Date of Evaluation:    9/18/23    Precautions:  None Next MD visit:   10/13/23 Dr. Chencho Pichardo in PM&R  Date of Surgery: n/a   Insurance Primary/Secondary: [address] [place], [place] [address]O / N/A     # Auth Visits: 5 visits             Subjective: Pt states he is feeling ok today. Calf pain is better. He has been back on the ladder and just sore from stepping. 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 Pain: 2/10 tail bone      Objective:   9/26/23- more level SI-  TTP R piriformis  9/22/23- hypertonic L thoracolumbar paraspinals Sijt rotation R tilt      Assessment: Added core strengthening with  foam roll push downs and TA marches, attempted heel lowers but pain increases. Reviewed nerve flossing as pt co tailbone pain with neck ROM.       Goals:   (to be met in 8 visits)  Pt will improve hip ABD and ER strength to 5/5 to increase ease with standing and walking   Pt will be able to squat to  light objects around the house with <3/10 pain   Pt will improve functional hip strength to report ability to ascend/descend 1 flight of stairs reciprocally without use of handrail   Pt will improve transversus abdominis recruitment to perform proper isometric contraction without requiring verbal or tactile cuing to promote advancement of therex   Pt will demonstrate good understanding of proper posture and body mechanics to decrease pain and improve spinal safety   Pt will report improved symptom centralization and absence of radicular symptoms for 3 consecutive days to improve function with ADL   Pt will have decreased paraspinal mm tension to tolerate standing >30 minutes for work and home activities   Pt will demonstrate improved core strength to be able to perform dead bug with </10 pain   Pt will demonstrate improved SLS to >10 seconds QUINN to promote safety and decrease risk of falls on uneven surfaces such as grass and gravel   Pt will be independent and compliant with comprehensive HEP to maintain progress achieved in PT     Plan: Progress ROM as able along with core bracing and nerve tensioning. Date: 9/22/2023  TX#: 2/5 Date: 9/26/23                TX#: 3/5 Date: 9/28/23                 TX#: 4/5 Date:                 TX#: 5/ Date:    Tx#: 6/   THERE EX:  Manual glut, piriformis stretch 30 secs 3x eahc LE  Figure 4 and corss body piriformis stretch 30 secs 3x each *  SL clams 10x3 each*  Sciatic nerve flossing manual supine 10x2 RLE   THERE EX:  Warm up Treadmill walking cues for arm swing 5 mins 2 mph lack of lumbar rotation noted, more lateral flexion - co in R hip  Standing gastroc incline board and soleus stretch 30 secs 3x RLE  PPT progressing to segemental bridge 10x*  Manual glut, piriformis stretch 30 secs 3x eahc LE  Blue band hip ABD with TA brace 10x2 THERE EX:  Warm up Treadmill walking cues for arm swing 5 mins 2.5 mph lack of lumbar rotation noted, more lateral flexion - co in R hip  Standing gastroc incline board and soleus stretch 30 secs 3x RLE  TA brace push down foam roll hook lying 15x 3 ec hold  TA march 10x2   PPT progressing to segemental bridge 10x  Manual glut, piriformis stretch 30 secs 3x eahc LE  Blue band hip ABD with TA brace 10x2     MANUAL:  Prone Stm B thoracolumbar paraspianls into R QL and glut 15 mins  Prone P T-L spine 5 mins grade 3-4  Prone PA SI L sided to reduce right rot grade 3-4 3 mins MANUAL:  Prone Stm B thoracolumbar paraspianls into R QL and glut 12 mins  Prone P T-L spine 5 mins grade 3-4  Prone PA SI L sided to reduce right rot grade 3-4 3 mins  Prone press up to hands 5x 5' 3x with PT OP MANUAL:  SL L side down SI MET 3x 10sec  Prone STM B thoracolumbar paraspianls into R QL and glut 12 mins  Prone P T-L spine 5 mins grade 3-4  Prone PA SI L sided to reduce right rot grade 3-4 3 mins  Prone press up to hands 5x 5' 3x with PT OP                   HEP: Denoted with * Has offloading coccyx cushions, and folded pillow for prolonged sitting or travel, seated sciatic nerve flossing RLE 10x2 daily, sitting lumbar flexion stretch 30 secs 3x  from eval    Charges: 1 manual 2 ther ex       Total Timed Treatment: 45 min  Total Treatment Time: 45 min

## 2023-10-02 ENCOUNTER — OFFICE VISIT (OUTPATIENT)
Dept: PHYSICAL THERAPY | Age: 53
End: 2023-10-02
Payer: COMMERCIAL

## 2023-10-02 PROCEDURE — 97140 MANUAL THERAPY 1/> REGIONS: CPT

## 2023-10-02 PROCEDURE — 97110 THERAPEUTIC EXERCISES: CPT

## 2023-10-02 NOTE — PROGRESS NOTES
Progress Summary  Pt has attended 5 visits in Physical Therapy. Diagnosis:   Low back pain   Lumbar radiculopathy M54.16   Chronic right shoulder pain (M25.511,G89.29)  Right hip pain (M25.551)      Referring Provider: Genny Schmitz  Date of Evaluation:    9/18/23    Precautions:  None Next MD visit:   10/13/23 Dr. Ambrocio Comes in PM&R  Date of Surgery: n/a   Insurance Primary/Secondary: [facility] / N/A     # Auth Visits: 5 visits             Subjective: Pt states  he was very sore in his low back after last session and then lifting at work. He states it resolved yesterday and he feels pretty good today. He states he just got a new mattress and feels that is helping. He reports a little tingling on the L side. Pain: 2/10 tail bone      Objective:   10/2/23- SLS R LE 22 LLE 14 sec   9/26/23- more level SI-  TTP R piriformis  9/22/23- hypertonic L thoracolumbar paraspinals Sijt rotation R tilt      Assessment: Pt is making gains in skilled PT meeting 3/10 goals and progressing toward remainder. Pt has demonstrated improved strength and pelvic stability as evidenced by increased SLS time. Pt improving control of deep core but fatigue is limiting. Work related positioning and strains impacting progress. CORINA improving. Pt will benefit from continued skilled physical therapy to address the above limitations and below goals to facilitate return to PLOF.     Goals:   (to be met in 8 visits)  Pt will improve hip ABD and ER strength to 5/5 to increase ease with standing and walking   Pt will be able to squat to  light objects around the house with <3/10 pain  10/2/23- Ongoing  Pt will improve functional hip strength to report ability to ascend/descend 1 flight of stairs reciprocally without use of handrail 10/2/23 GOAL MET  Pt will improve transversus abdominis recruitment to perform proper isometric contraction without requiring verbal or tactile cuing to promote advancement of therex  10/2/23 Progressing  Pt will demonstrate good understanding of proper posture and body mechanics to decrease pain and improve spinal safety 10/2/23 GOAL MET  Pt will report improved symptom centralization and absence of radicular symptoms for 3 consecutive days to improve function with ADL 10/2/23 Ongoing  Pt will have decreased paraspinal mm tension to tolerate standing >30 minutes for work and home activities 10/2/23- ongoing 15 mins  Pt will demonstrate improved core strength to be able to perform dead bug with <4/10 pain 10/2/23 Ongoing  Pt will demonstrate improved SLS to >10 seconds QUINN to promote safety and decrease risk of falls on uneven surfaces such as grass and gravel 10/2/23 GOAL MET  Pt will be independent and compliant with comprehensive HEP to maintain progress achieved in PT 10/2/23- ongoing    Plan: Progress ROM as able along with core bracing and nerve tensioning. Add monster walks next visit. Date: 9/22/2023  TX#: 2/5 Date: 9/26/23                TX#: 3/5 Date: 9/28/23                 TX#: 4/5 Date: 10/2/23                TX#: 5/5 Date:    Tx#: 6/   THERE EX:  Manual glut, piriformis stretch 30 secs 3x eahc LE  Figure 4 and corss body piriformis stretch 30 secs 3x each *  SL clams 10x3 each*  Sciatic nerve flossing manual supine 10x2 RLE   THERE EX:  Warm up Treadmill walking cues for arm swing 5 mins 2 mph lack of lumbar rotation noted, more lateral flexion - co in R hip  Standing gastroc incline board and soleus stretch 30 secs 3x RLE  PPT progressing to segemental bridge 10x*  Manual glut, piriformis stretch 30 secs 3x eahc LE  Blue band hip ABD with TA brace 10x2 THERE EX:  Warm up Treadmill walking cues for arm swing 5 mins 2.5 mph lack of lumbar rotation noted, more lateral flexion - co in R hip  Standing gastroc incline board and soleus stretch 30 secs 3x RLE  TA brace push down foam roll hook lying 15x 3 ec hold  TA march 10x2   PPT progressing to segemental bridge 10x  Manual glut, piriformis stretch 30 secs 3x eahc LE  Blue band hip ABD with TA brace 10x2 THERE EX:  Warm up Treadmill walking cues for arm swing 8 mins 2.5 mph   Standing gastroc incline board and soleus stretch 30 secs 3x RLE  Manual glut, piriformis stretch 30 secs 3x eahc LE  TA brace push down foam roll hook lying 15x 3 sec hold  Core foam roll isometric seated 5x push/pull twist/twist mod tactile cues    MANUAL:  Prone Stm B thoracolumbar paraspianls into R QL and glut 15 mins  Prone P T-L spine 5 mins grade 3-4  Prone PA SI L sided to reduce right rot grade 3-4 3 mins MANUAL:  Prone Stm B thoracolumbar paraspianls into R QL and glut 12 mins  Prone P T-L spine 5 mins grade 3-4  Prone PA SI L sided to reduce right rot grade 3-4 3 mins  Prone press up to hands 5x 5' 3x with PT OP MANUAL:  SL L side down SI MET 3x 10sec  Prone STM B thoracolumbar paraspianls into R QL and glut 12 mins  Prone P T-L spine 5 mins grade 3-4  Prone PA SI L sided to reduce right rot grade 3-4 3 mins  Prone press up to hands 5x 5' 3x with PT OP MANUAL:  Prone STM B thoracolumbar paraspianls into R QL and L glut/piriformis 15 mins  Prone P T-L spine 5 mins grade 3-4                    HEP: Denoted with * Has offloading coccyx cushions, and folded pillow for prolonged sitting or travel, seated sciatic nerve flossing RLE 10x2 daily, sitting lumbar flexion stretch 30 secs 3x  from eval    Charges: 1 manual 2 ther ex       Total Timed Treatment: 45 min  Total Treatment Time: 45 min  Oswestry Disability Index Score  Score: 58 % (9/18/2023  9:31 AM)    Post Oswestry Disability Index Score  Post Score: 34 % (10/2/2023  7:43 AM)    24 % improvement    Plan: Continue skilled Physical Therapy 1-2 x/week or a total of 2 visits over a 90 day period. Treatment will include: Manual Therapy; Therapeutic Exercises; Neuromuscular Re-education; Therapeutic Activity; Electrical Stim; Ultrasound; Pt education; Home exercise program instructions;         Patient/Family/Caregiver was advised of these findings, precautions, and treatment options and has agreed to actively participate in planning and for this course of care. Thank you for your referral. If you have any questions, please contact me at Dept: [phone]. Sincerely,  Electronically signed by therapist: Moshe Charlton PT, DPT    Physician's certification required:  Yes  Please co-sign or sign and return this letter via fax as soon as possible to [phone]. I certify the need for these services furnished under this plan of treatment and while under my care.     X___________________________________________________ Date____________________    Certification From: 47/1/5832  To:12/31/2023

## 2023-10-05 ENCOUNTER — OFFICE VISIT (OUTPATIENT)
Dept: PHYSICAL THERAPY | Age: 53
End: 2023-10-05
Payer: COMMERCIAL

## 2023-10-05 PROCEDURE — 97140 MANUAL THERAPY 1/> REGIONS: CPT

## 2023-10-05 PROCEDURE — 97110 THERAPEUTIC EXERCISES: CPT

## 2023-10-05 PROCEDURE — 97112 NEUROMUSCULAR REEDUCATION: CPT

## 2023-10-05 NOTE — PROGRESS NOTES
Diagnosis:   Low back pain   Lumbar radiculopathy M54.16   Chronic right shoulder pain (M25.511,G89.29)  Right hip pain (M25.551)      Referring Provider: Burleigh Lefort  Date of Evaluation:    9/18/23    Precautions:  None Next MD visit:   10/13/23 Dr. Sergio Jaimes in PM&R  Date of Surgery: n/a   Insurance Primary/Secondary: [address] [place] [address] / N/A     # Auth Visits: 7 visits             Subjective: Pt states upon waking he feels no pain, after getting up about 3/10 currently 1/10. More R sided tailbone pain today. He reports severe pain with BM. Pain: 1/10 tail bone      Objective:   10/5/23- TTP, restriction R coccygeus  10/2/23- SLS R LE 22 LLE 14 sec   9/26/23- more level SI-  TTP R piriformis  9/22/23- hypertonic L thoracolumbar paraspinals Sijt rotation R tilt      Assessment: Focus on pelvic floor control for coccygeus contraction to reduce tension on R side of tailbone. Mod cues for contraction without rectus recruitment.      Goals:   (to be met in 8 visits)  Pt will improve hip ABD and ER strength to 5/5 to increase ease with standing and walking   Pt will be able to squat to  light objects around the house with <3/10 pain  10/2/23- Ongoing  Pt will improve functional hip strength to report ability to ascend/descend 1 flight of stairs reciprocally without use of handrail 10/2/23 GOAL MET  Pt will improve transversus abdominis recruitment to perform proper isometric contraction without requiring verbal or tactile cuing to promote advancement of therex  10/2/23 Progressing  Pt will demonstrate good understanding of proper posture and body mechanics to decrease pain and improve spinal safety 10/2/23 GOAL MET  Pt will report improved symptom centralization and absence of radicular symptoms for 3 consecutive days to improve function with ADL 10/2/23 Ongoing  Pt will have decreased paraspinal mm tension to tolerate standing >30 minutes for work and home activities 10/2/23- ongoing 15 mins  Pt will demonstrate improved core strength to be able to perform dead bug with <4/10 pain 10/2/23 Ongoing  Pt will demonstrate improved SLS to >10 seconds QUINN to promote safety and decrease risk of falls on uneven surfaces such as grass and gravel 10/2/23 GOAL MET  Pt will be independent and compliant with comprehensive HEP to maintain progress achieved in PT 10/2/23- ongoing    Plan: Progress ROM as able along with core bracing and nerve tensioning. Add monster walks next visit. D/c next visit.   Date: 9/22/2023  TX#: 2/5 Date: 9/26/23                TX#: 3/5 Date: 9/28/23                 TX#: 4/5 Date: 10/2/23                TX#: 5/5 Date: 10/5/23  Tx#: 6/7   THERE EX:  Manual glut, piriformis stretch 30 secs 3x eahc LE  Figure 4 and corss body piriformis stretch 30 secs 3x each *  SL clams 10x3 each*  Sciatic nerve flossing manual supine 10x2 RLE   THERE EX:  Warm up Treadmill walking cues for arm swing 5 mins 2 mph lack of lumbar rotation noted, more lateral flexion - co in R hip  Standing gastroc incline board and soleus stretch 30 secs 3x RLE  PPT progressing to segemental bridge 10x*  Manual glut, piriformis stretch 30 secs 3x eahc LE  Blue band hip ABD with TA brace 10x2 THERE EX:  Warm up Treadmill walking cues for arm swing 5 mins 2.5 mph lack of lumbar rotation noted, more lateral flexion - co in R hip  Standing gastroc incline board and soleus stretch 30 secs 3x RLE  TA brace push down foam roll hook lying 15x 3 ec hold  TA march 10x2   PPT progressing to segemental bridge 10x  Manual glut, piriformis stretch 30 secs 3x eahc LE  Blue band hip ABD with TA brace 10x2 THERE EX:  Warm up Treadmill walking cues for arm swing 8 mins 2.5 mph   Standing gastroc incline board and soleus stretch 30 secs 3x RLE  Manual glut, piriformis stretch 30 secs 3x eahc LE  TA brace push down foam roll hook lying 15x 3 sec hold  Core foam roll isometric seated 5x push/pull twist/twist mod tactile cues THERE EX:  Warm up Treadmill walking cues for arm swing 8 mins 2.5 mph   Standing gastroc incline board and soleus stretch 30 secs 3x RLE  Manual glut, piriformis stretch 30 secs 3x eahc LE  SL Clams 15x2 RLE  Contract relax piriformis stretch 30 secs 3x  Hip ADD ball squeeze 10x3 3\" hold   MANUAL:  Prone Stm B thoracolumbar paraspianls into R QL and glut 15 mins  Prone P T-L spine 5 mins grade 3-4  Prone PA SI L sided to reduce right rot grade 3-4 3 mins MANUAL:  Prone Stm B thoracolumbar paraspianls into R QL and glut 12 mins  Prone P T-L spine 5 mins grade 3-4  Prone PA SI L sided to reduce right rot grade 3-4 3 mins  Prone press up to hands 5x 5' 3x with PT OP MANUAL:  SL L side down SI MET 3x 10sec  Prone STM B thoracolumbar paraspianls into R QL and glut 12 mins  Prone P T-L spine 5 mins grade 3-4  Prone PA SI L sided to reduce right rot grade 3-4 3 mins  Prone press up to hands 5x 5' 3x with PT OP MANUAL:  Prone STM B thoracolumbar paraspianls into R QL and L glut/piriformis 15 mins  Prone P T-L spine 5 mins grade 3-4   MANUAL:  SL STM, cross friction R coccygeus and glut 10 mins effleruage         NEURO RE ED:  Hook lying pelvic floor contraction with ADD 10x3 3\" hold  PPT 10x3 3\"  Squat tap 21 in mat table green band for hip ABD with pelvic floor contraction 10x2          HEP: Denoted with * Has offloading coccyx cushions, and folded pillow for prolonged sitting or travel, seated sciatic nerve flossing RLE 10x2 daily, sitting lumbar flexion stretch 30 secs 3x  from eval    Charges: 1 manual 1 ther ex  1 neuro re ed     Total Timed Treatment: 45 min  Total Treatment Time: 45 min

## 2023-10-09 ENCOUNTER — OFFICE VISIT (OUTPATIENT)
Dept: PHYSICAL THERAPY | Age: 53
End: 2023-10-09
Payer: COMMERCIAL

## 2023-10-09 PROCEDURE — 97112 NEUROMUSCULAR REEDUCATION: CPT

## 2023-10-09 PROCEDURE — 97110 THERAPEUTIC EXERCISES: CPT

## 2023-10-09 PROCEDURE — 97140 MANUAL THERAPY 1/> REGIONS: CPT

## 2023-10-09 NOTE — PROGRESS NOTES
Discharge Summary  Pt has attended 7 visits in Physical Therapy. Diagnosis:   Low back pain   Lumbar radiculopathy M54.16   Chronic right shoulder pain (M25.511,G89.29)  Right hip pain (M25.551)      Referring Provider: Celestina Garrett  Date of Evaluation:    9/18/23    Precautions:  None Next MD visit:   10/13/23 Dr. Suzi Dakins in PM&R  Date of Surgery: n/a   Insurance Primary/Secondary: [address] [place] [address] / N/A     # Auth Visits: 7 visits             Subjective: Pt states he felt better after last visit, sore, but better today. He sees PMR 10/13/23 for further treatment. Pain: 1/10 tail bone      Objective:   10/5/23- TTP, restriction R coccygeus  10/2/23- SLS R LE 22 LLE 14 sec   9/26/23- more level SI-  TTP R piriformis  9/22/23- hypertonic L thoracolumbar paraspinals Sijt rotation R tilt      Assessment:   Pt to be d/c from skilled PT 2/2 payer source. Pt has made gains in core recruitment and TA contraction without compensation or cuing. Pt has improved lifting mechanics and piriformis length with less radiating pain. Pelvic stability and lateral hip strength increased as evidenced by SLS time but patient remains limited to less than 15 mins for standing tolerance before pain increases. Tension in gluts and pelvic floor adding to pull on coccyx, although pt now able to tolerate pelvic tilts and segmental bridges without increased pain. CORINA improvement by 24%. Pt has HEP to maintain/progress strength, ROM, independently.      Goals:   (to be met in 8 visits)  Pt will improve hip ABD and ER strength to 5/5 to increase ease with standing and walking 10/9/23- GOAL MET  Pt will be able to squat to  light objects around the house with <3/10 pain  10/9/23- Ongoing  Pt will improve functional hip strength to report ability to ascend/descend 1 flight of stairs reciprocally without use of handrail 10/2/23 GOAL MET  Pt will improve transversus abdominis recruitment to perform proper isometric contraction without requiring verbal or tactile cuing to promote advancement of therex  10/9/23- GOAL MET  Pt will demonstrate good understanding of proper posture and body mechanics to decrease pain and improve spinal safety 10/2/23 GOAL MET  Pt will report improved symptom centralization and absence of radicular symptoms for 3 consecutive days to improve function with ADL 10/9/23- GOAL MET  Pt will have decreased paraspinal mm tension to tolerate standing >30 minutes for work and home activities 10/9/23- ongoing 15 mins  Pt will demonstrate improved core strength to be able to perform dead bug with <4/10 pain 10/2/23 Ongoing  Pt will demonstrate improved SLS to >10 seconds QUINN to promote safety and decrease risk of falls on uneven surfaces such as grass and gravel 10/2/23 GOAL MET  Pt will be independent and compliant with comprehensive HEP to maintain progress achieved in PT 10/9/23- MET    Plan: d/c  Date: 9/22/2023  TX#: 2/5 Date: 9/26/23                TX#: 3/5 Date: 9/28/23                 TX#: 4/5 Date: 10/2/23                TX#: 5/5 Date: 10/5/23  Tx#: 6/7 Date: 10/9/23  TX#: 7/7   THERE EX:  Manual glut, piriformis stretch 30 secs 3x eahc LE  Figure 4 and corss body piriformis stretch 30 secs 3x each *  SL clams 10x3 each*  Sciatic nerve flossing manual supine 10x2 RLE   THERE EX:  Warm up Treadmill walking cues for arm swing 5 mins 2 mph lack of lumbar rotation noted, more lateral flexion - co in R hip  Standing gastroc incline board and soleus stretch 30 secs 3x RLE  PPT progressing to segemental bridge 10x*  Manual glut, piriformis stretch 30 secs 3x eahc LE  Blue band hip ABD with TA brace 10x2 THERE EX:  Warm up Treadmill walking cues for arm swing 5 mins 2.5 mph lack of lumbar rotation noted, more lateral flexion - co in R hip  Standing gastroc incline board and soleus stretch 30 secs 3x RLE  TA brace push down foam roll hook lying 15x 3 ec hold  TA march 10x2   PPT progressing to segemental bridge 10x  Manual glut, piriformis stretch 30 secs 3x eahc LE  Blue band hip ABD with TA brace 10x2 THERE EX:  Warm up Treadmill walking cues for arm swing 8 mins 2.5 mph   Standing gastroc incline board and soleus stretch 30 secs 3x RLE  Manual glut, piriformis stretch 30 secs 3x eahc LE  TA brace push down foam roll hook lying 15x 3 sec hold  Core foam roll isometric seated 5x push/pull twist/twist mod tactile cues THERE EX:  Warm up Treadmill walking cues for arm swing 8 mins 2.5 mph   Standing gastroc incline board and soleus stretch 30 secs 3x RLE  Manual glut, piriformis stretch 30 secs 3x eahc LE  SL Clams 15x2 RLE  Contract relax piriformis stretch 30 secs 3x  Hip ADD ball squeeze 10x3 3\" hold THERE EX:  Warm up Treadmill walking cues for arm swing 8 mins 2.5 mph   Standing gastroc incline board and soleus stretch 30 secs 3x RLE  SL manual hip flexor stretch 30 secs 3x  Manual glut, piriformis stretch 30 secs 3x eahc LE  SL Clams 15x2 RLE  Contract relax piriformis stretch 30 secs 3x  Hip ADD ball squeeze 10x3 3\" hold  Lateral squat walk blue band 10x2*   MANUAL:  Prone Stm B thoracolumbar paraspianls into R QL and glut 15 mins  Prone P T-L spine 5 mins grade 3-4  Prone PA SI L sided to reduce right rot grade 3-4 3 mins MANUAL:  Prone Stm B thoracolumbar paraspianls into R QL and glut 12 mins  Prone P T-L spine 5 mins grade 3-4  Prone PA SI L sided to reduce right rot grade 3-4 3 mins  Prone press up to hands 5x 5' 3x with PT OP MANUAL:  SL L side down SI MET 3x 10sec  Prone STM B thoracolumbar paraspianls into R QL and glut 12 mins  Prone P T-L spine 5 mins grade 3-4  Prone PA SI L sided to reduce right rot grade 3-4 3 mins  Prone press up to hands 5x 5' 3x with PT OP MANUAL:  Prone STM B thoracolumbar paraspianls into R QL and L glut/piriformis 15 mins  Prone P T-L spine 5 mins grade 3-4   MANUAL:  SL STM, cross friction R coccygeus and glut 10 mins effleruage   MANUAL:  SL STM, cross friction R coccygeus and glut 10 mins effleruage       NEURO RE ED:  Hook lying pelvic floor contraction with ADD 10x3 3\" hold  PPT 10x3 3\"  Squat tap 21 in mat table green band for hip ABD with pelvic floor contraction 10x2 NEURO RE ED:  Hook lying pelvic floor contraction with ADD 10x3 3\" hold  PPT 10x3 3\"  Squat tap 21 in mat table green band for hip ABD with pelvic floor contraction 10x2           HEP: Denoted with * Has offloading coccyx cushions, and folded pillow for prolonged sitting or travel, seated sciatic nerve flossing RLE 10x2 daily, sitting lumbar flexion stretch 30 secs 3x  from eval    Charges: 1 manual 1 ther ex  1 neuro re ed     Total Timed Treatment: 45 min  Total Treatment Time: 45 min  Oswestry Disability Index Score  Score: 58 % (9/18/2023  9:31 AM)    Post Oswestry Disability Index Score  Post Score: 34 % (10/2/2023  7:43 AM)    24 % improvement    Plan: Discontinue skilled Physical Therapy     Patient/Family/Caregiver was advised of these findings, precautions, and treatment options and has agreed to actively participate in planning and for this course of care. Thank you for your referral. If you have any questions, please contact me at Dept: [phone]. Sincerely,  Electronically signed by therapist: Jumana Vaughan PT, DPT    Physician's certification required:  No  Please co-sign or sign and return this letter via fax as soon as possible to [phone]. I certify the need for these services furnished under this plan of treatment and while under my care.       Certification From: 40/4/5389  To:1/7/2024

## 2023-10-13 ENCOUNTER — OFFICE VISIT (OUTPATIENT)
Dept: PHYSICAL MEDICINE AND REHAB | Facility: CLINIC | Age: 53
End: 2023-10-13
Payer: COMMERCIAL

## 2023-10-13 ENCOUNTER — HOSPITAL ENCOUNTER (OUTPATIENT)
Dept: GENERAL RADIOLOGY | Facility: HOSPITAL | Age: 53
Discharge: HOME | End: 2023-10-13
Attending: PHYSICAL MEDICINE & REHABILITATION
Payer: COMMERCIAL

## 2023-10-13 VITALS — HEIGHT: 68 IN | WEIGHT: 198 LBS | BODY MASS INDEX: 30.01 KG/M2

## 2023-10-13 DIAGNOSIS — M47.816 LUMBAR FACET ARTHROPATHY: ICD-10-CM

## 2023-10-13 DIAGNOSIS — M43.16 ANTEROLISTHESIS OF LUMBAR SPINE: ICD-10-CM

## 2023-10-13 DIAGNOSIS — M54.16 BILATERAL LUMBAR RADICULOPATHY: ICD-10-CM

## 2023-10-13 DIAGNOSIS — M54.16 BILATERAL LUMBAR RADICULOPATHY: Primary | ICD-10-CM

## 2023-10-13 PROCEDURE — 72114 X-RAY EXAM L-S SPINE BENDING: CPT | Performed by: PHYSICAL MEDICINE & REHABILITATION

## 2023-10-13 RX ORDER — GABAPENTIN 300 MG/1
CAPSULE ORAL
Qty: 90 CAPSULE | Refills: 0 | Status: SHIPPED | OUTPATIENT
Start: 2023-10-13

## 2023-10-13 RX ORDER — METHYLPREDNISOLONE 4 MG/1
TABLET ORAL
Qty: 1 EACH | Refills: 0 | Status: SHIPPED | OUTPATIENT
Start: 2023-10-13

## 2023-10-13 NOTE — PATIENT INSTRUCTIONS
-Start physical therapy and home exercises  -Medrol dose pack to be started today  -Gabapentin 300mg three times daily  -Ice/Heat as tolerated  -Xray on the way out today  -Please stop the medication if you have any side effects and call the office if you have any questions or concerns  -MRI of the lumbar spine and follow up after

## 2023-11-11 ENCOUNTER — HOSPITAL ENCOUNTER (OUTPATIENT)
Dept: MRI IMAGING | Facility: HOSPITAL | Age: 53
Discharge: HOME | End: 2023-11-11
Attending: PHYSICAL MEDICINE & REHABILITATION
Payer: COMMERCIAL

## 2023-11-11 DIAGNOSIS — M43.16 ANTEROLISTHESIS OF LUMBAR SPINE: ICD-10-CM

## 2023-11-11 DIAGNOSIS — M54.16 BILATERAL LUMBAR RADICULOPATHY: ICD-10-CM

## 2023-11-11 DIAGNOSIS — M47.816 LUMBAR FACET ARTHROPATHY: ICD-10-CM

## 2023-11-11 PROCEDURE — 72148 MRI LUMBAR SPINE W/O DYE: CPT | Performed by: PHYSICAL MEDICINE & REHABILITATION

## 2023-11-16 ENCOUNTER — ORDER TRANSCRIPTION (OUTPATIENT)
Dept: PHYSICAL THERAPY | Facility: HOSPITAL | Age: 53
End: 2023-11-16

## 2023-11-16 DIAGNOSIS — M43.16 ANTEROLISTHESIS OF LUMBAR SPINE: ICD-10-CM

## 2023-11-16 DIAGNOSIS — M47.816 LUMBAR FACET ARTHROPATHY: ICD-10-CM

## 2023-11-16 DIAGNOSIS — M54.16 BILATERAL LUMBAR RADICULOPATHY: Primary | ICD-10-CM

## 2023-11-27 NOTE — TELEPHONE ENCOUNTER
LVMTCB. Will need to check with patient how he is taking medication and if he is tolerating it well. Per sig: \" Start with night time dose only. If well tolerated, increase to two times daily. If well tolerated, increase to three times daily. \"

## 2023-11-28 ENCOUNTER — TELEMEDICINE (OUTPATIENT)
Dept: PHYSICAL MEDICINE AND REHAB | Facility: CLINIC | Age: 53
End: 2023-11-28
Payer: COMMERCIAL

## 2023-11-28 DIAGNOSIS — M47.816 LUMBAR FACET ARTHROPATHY: ICD-10-CM

## 2023-11-28 DIAGNOSIS — I10 HTN (HYPERTENSION), BENIGN: ICD-10-CM

## 2023-11-28 DIAGNOSIS — M43.16 ANTEROLISTHESIS OF LUMBAR SPINE: ICD-10-CM

## 2023-11-28 DIAGNOSIS — M54.16 BILATERAL LUMBAR RADICULOPATHY: Primary | ICD-10-CM

## 2023-11-28 RX ORDER — GABAPENTIN 300 MG/1
300 CAPSULE ORAL 3 TIMES DAILY
Qty: 90 CAPSULE | Refills: 0 | Status: SHIPPED | OUTPATIENT
Start: 2023-11-28

## 2023-11-28 NOTE — TELEPHONE ENCOUNTER
Refill Request    Medication request: gabapentin 300 MG Oral Cap. Start with night time dose only. If well tolerated, increase to two times daily. If well tolerated, increase to three times daily. LOV:10/13/2023 Oneida Gleason DO   Due back to clinic per last office note: Per Dr. Kai Lucero: Ryder Leo in about 4 weeks (around 11/10/2023). \"  NOV: 11/28/2023 Oneida Gleason DO      [place]/Last refill: 10/13/2023 #90    Urine drug screen (if applicable): n/a  Pain contract: n/a    LOV plan (if weaning or changing medications): Per Dr. Kai Lucero: \"Gabapentin 300mg three times daily. \"

## 2023-11-28 NOTE — PROGRESS NOTES
[address]    Telemedicine Visit - Follow Up Evaluation    Telehealth Verbal Consent   I conducted a telehealth visit with Judy Cruz today, 11/28/23, which was completed using two-way, real-time interactive audio and video communication. This has been done in good ying to provide continuity of care in the best interest of the provider-patient relationship, due to the COVID -19 public health crisis/national emergency where restrictions of face-to-face office visits are ongoing. Every conscious effort was taken to allow for sufficient and adequate time to complete the visit. The patient was made aware of the limitations of the telehealth visit, including treatment limitations as no physical exam could be performed. The patient was advised to call 911 or to go to the ER in case there was an emergency. The patient was also advised of the potential privacy & security concerns related to the telehealth platform. The patient was made aware of where to find [facility] notice of privacy practices, telehealth consent form and other related consent forms and documents. which are located on the [facility] website. The patient verbally agreed to telehealth consent form, related consents and the risks discussed. Lastly, the patient confirmed that they were in [place]. Included in this visit, time may have been spent reviewing labs, medications, radiology tests and decision making. Appropriate medical decision-making and tests are ordered as detailed in the plan of care above. Coding/billing information is submitted for this visit based on complexity of care and/or time spent for the visit. HISTORY OF PRESENT ILLNESS:     Patient is following up after MRI imaging of the lumbar spine. He has scheduled physical therapy.   He continues to experience low back pain with radiation into the tailbone into the posterior aspect of the buttocks bilaterally and occasional tightness and tingling sensation in bilateral toes. He notices these symptoms with any prolonged standing and activity but also with prolonged sitting. He denies any saddle anesthesia, any change in strength sensation or bowel bladder. He is continuing gabapentin 300 mg now has uptitrated to 3 times daily which she is finding to be helpful. He has completed the Medrol Dosepak as well. IMAGING:   MRI lumbar spine completed on 11/11/2023 was personally reviewed which is notable for degenerative disc and facet disease throughout the lumbar spine. There is severe narrowing of the canal at L3-4 and L4-5. There is degenerative anterolisthesis of L4 relative to L5 with reactive endplate degenerative changes at the L4-5 level. Only, there is severe foraminal narrowing bilaterally from the L3-S1 levels. There is moderate spinal stenosis at the L5-S1 level as well. He has multilevel disc herniations throughout the lumbar spine. All imaging results were reviewed and discussed with patient. ASSESSMENT:     1. Bilateral lumbar radiculopathy    2. Lumbar facet arthropathy    3. Anterolisthesis of lumbar spine    4. HTN (hypertension), benign          PLAN:   Mo Ferris is a 46year old male following up for low back pain secondary to spinal stenosis centrally and foraminally bilaterally at multiple levels throughout the lumbar spine. He has degenerative disks with bulging at multiple levels as well as facet arthropathy throughout the lumbar spine. I advised the patient we start with a diagnostic and therapeutic L5-S1 interlaminar epidural steroid injection to see if we can get further improvement in his symptoms. If he continues to have persistent pain after we could consider facet joint injections next. I recommend that he start the PT program as scheduled. I advised him to continue gabapentin 300 mg 3 times daily. We will follow-up 2 weeks after the epidural procedure.     Follow-up:   For injection    We discussed that a telemedicine visit is in place of an office visit; however, this limits the ability to perform a thorough physical examination which may affect objective findings related to a specific condition and can affect treatment. The patient verbalized understanding with this plan and was in agreement. There are no barriers to learning. All questions were answered. Please note Dragon dictation software was used to dictate this note which may result in inadvertent typos. Brenda LOPEZ [address] [facility]  Physical Medicine and Rehabilitation/Sports Medicine    PAST MEDICAL HISTORY:   History reviewed. No pertinent past medical history. PAST SURGICAL HISTORY:   History reviewed. No pertinent surgical history. CURRENT MEDICATIONS:     Current Outpatient Medications   Medication Sig Dispense Refill    gabapentin 300 MG Oral Cap Take 1 capsule (300 mg total) by mouth in the morning, at noon, and at bedtime. 90 capsule 0    methylPREDNISolone 4 MG Oral Tablet Therapy Pack As directed 1 each 0    Meloxicam 7.5 MG Oral Tab Take 1 tablet (7.5 mg total) by mouth every 12 (twelve) hours as needed for Pain. 30 tablet 1    HYDROcodone-acetaminophen  MG Oral Tab Take 1 tablet by mouth every 6 (six) hours as needed for Pain. (Patient not taking: Reported on 10/13/2023)      lisinopril 5 MG Oral Tab Take 1 tablet (5 mg total) by mouth daily. 90 tablet 3    clotrimazole-betamethasone 1-0.05 % External Cream Apply 1 Application topically 2 (two) times daily as needed. 60 g 0    Albuterol Sulfate HFA (VENTOLIN HFA) 108 (90 Base) MCG/ACT Inhalation Aero Soln 1-2 puffs every 4-6 hrs as needed 1 Inhaler 1    Fluticasone Propionate 50 MCG/ACT Nasal Suspension 2 sprays by Each Nare route daily.  3 Bottle 3         ALLERGIES:   No Known Allergies      FAMILY HISTORY:     Family History   Problem Relation Age of Onset    Heart Disorder Mother     Hypertension Mother     Alcohol and Other Disorders Associated Mother           SOCIAL HISTORY:     Social History     Socioeconomic History    Marital status:    Tobacco Use    Smoking status: Never    Smokeless tobacco: Never   Vaping Use    Vaping Use: Never used   Substance and Sexual Activity    Alcohol use: Not Currently     Alcohol/week: 1.0 - 2.0 standard drink of alcohol     Types: 1 - 2 Cans of beer per week    Drug use: No   Social History Narrative    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            REVIEW OF SYSTEMS:   As noted in HPI      PHYSICAL EXAM:   General: No immediate distress  Head: Normocephalic/ Atraumatic  Eyes: Extra-occular movements intact  Ears/Nose/Throat:  External appearance identifies normal appearance without obvious deformity  Cardiovascular: No cyanosis, clubbing or edema  Respiratory: Non-labored respirations  Skin: No lesions noted   Neurological: alert & oriented x 3, attentive, able to follow commands, comprehention intact, spontaneous speech intact  Psychiatric: Mood and affect appropriate  Musculoskeletal Exam:  No change since last exam        LABS:     Lab Results   Component Value Date     01/11/2023    A1C 5.5 01/11/2023     Lab Results   Component Value Date    WBC 10.2 01/11/2023    RBC 5.02 01/11/2023    HGB 15.2 01/11/2023    HCT 44.3 01/11/2023    MCV 88.2 01/11/2023    MCH 30.3 01/11/2023    MCHC 34.3 01/11/2023    RDW 12.7 01/11/2023    .0 01/11/2023     Lab Results   Component Value Date     (H) 09/20/2023    BUN 18 09/20/2023    BUNCREA 16.5 06/01/2020    CREATSERUM 0.93 09/20/2023    ANIONGAP 2 09/20/2023    GFRNAA 102 01/18/2022    GFRAA 118 01/18/2022    CA 8.9 09/20/2023    OSMOCALC 287 09/20/2023    ALKPHO 64 09/20/2023    AST 22 09/20/2023    ALT 31 09/20/2023    BILT 0.3 09/20/2023    TP 7.1 09/20/2023    ALB 3.6 09/20/2023    GLOBULIN 3.5 09/20/2023     09/20/2023    K 4.1 09/20/2023     09/20/2023    CO2 27.0 09/20/2023     No results found for: \"PTP\", \"PT\", \"INR\"  No results found for: \"VITD\", \"QVITD\", \"[id number]\"

## 2024-01-05 ENCOUNTER — TELEPHONE (OUTPATIENT)
Dept: PHYSICAL THERAPY | Facility: HOSPITAL | Age: 54
End: 2024-01-05

## 2024-01-05 DIAGNOSIS — M54.16 BILATERAL LUMBAR RADICULOPATHY: Primary | ICD-10-CM

## 2024-01-08 ENCOUNTER — APPOINTMENT (OUTPATIENT)
Dept: PHYSICAL THERAPY | Facility: HOSPITAL | Age: 54
End: 2024-01-08
Attending: PHYSICAL MEDICINE & REHABILITATION
Payer: COMMERCIAL

## 2024-01-08 ENCOUNTER — TELEPHONE (OUTPATIENT)
Dept: PHYSICAL THERAPY | Facility: HOSPITAL | Age: 54
End: 2024-01-08

## 2024-01-08 ENCOUNTER — TELEPHONE (OUTPATIENT)
Dept: PHYSICAL MEDICINE AND REHAB | Facility: CLINIC | Age: 54
End: 2024-01-08

## 2024-01-08 DIAGNOSIS — M54.16 BILATERAL LUMBAR RADICULOPATHY: Primary | ICD-10-CM

## 2024-01-08 DIAGNOSIS — M47.816 LUMBAR FACET ARTHROPATHY: ICD-10-CM

## 2024-01-08 RX ORDER — GABAPENTIN 300 MG/1
300 CAPSULE ORAL 3 TIMES DAILY
Qty: 90 CAPSULE | Refills: 0 | Status: SHIPPED | OUTPATIENT
Start: 2024-01-08

## 2024-01-08 NOTE — TELEPHONE ENCOUNTER
Pt called and advised of missed appt at 2pm this date and notified of opening at 5:15.  Pt given call back number to call to take the appt.

## 2024-01-08 NOTE — TELEPHONE ENCOUNTER
Received message from VENUS Marcelo stating patient no longer has [employer] IHP HMO and has new BCBS PPO insurance and would like to proceed with injection ordered by Dr. Armendariz 11/28/23    Initiated authorization for L5-S1 Interlaminar Epidural Steroid Injection CPT 18079 dx:M54.16 to be done at [facility] with Carelon  Status: Approved w/ order #[id number] valid 1/15/23-2/13/24

## 2024-01-09 NOTE — TELEPHONE ENCOUNTER
Patient has been scheduled for L5-S1 Interlaminar epidural steroid injection on 1/15/2024 at the [facility] with Dr. Armendariz.   -Anesthesia type:  Local  -Patient was advised that if he/she does receive the covid vaccine it needs to be at least 2 weeks before or after the injection.  -Medications and allergies reviewed.  -Patient reminded to hold NSAIDs (Ibuprofen, ASA 81, Aleve, Naproxen, Mobic, Diclofenac, Etodolac, Celebrex etc.) for 3 days prior to Lumbar MBB/Facet if BMI is greater than 35. For Cervical injections only hold multivitamins, Vitamin E, Fish Oil, Phentermine/Lomaira for 7 days prior to injection and NSAIDS.   mg to be held for 7 days prior to injections.  -If patient is receiving MAC/IVCS, weight loss oral/injectable medications will need to be held for 7 days prior to injection.  -Patient informed to fast 12 hours prior to procedure with IVCS/MAC.   -If on blood thinner, clearance has been received and approved to hold this medication by provider.   -Patient informed of [facility]'s  policy:  he/she will need a  to and from procedure and must be on site for their entirety of their visit, if their ride is unable to the procedure will be cancelled.   -[facility] is located in the [facility] 1st floor,  may park in the yellow/purple parking lot.  Patient verbalized understanding and agrees with plan.  Scheduled in Epic: Yes  Scheduled in Surgical Case: Yes  Follow up appointment made: NOV: NO- HMO.

## 2024-01-15 ENCOUNTER — APPOINTMENT (OUTPATIENT)
Dept: SURGERY | Facility: CLINIC | Age: 54
End: 2024-01-15
Payer: COMMERCIAL

## 2024-01-16 ENCOUNTER — OFFICE VISIT (OUTPATIENT)
Dept: PHYSICAL THERAPY | Facility: HOSPITAL | Age: 54
End: 2024-01-16
Attending: PHYSICAL MEDICINE & REHABILITATION
Payer: COMMERCIAL

## 2024-01-16 DIAGNOSIS — M43.16 ANTEROLISTHESIS OF LUMBAR SPINE: ICD-10-CM

## 2024-01-16 DIAGNOSIS — M54.16 BILATERAL LUMBAR RADICULOPATHY: Primary | ICD-10-CM

## 2024-01-16 DIAGNOSIS — M47.816 LUMBAR FACET ARTHROPATHY: ICD-10-CM

## 2024-01-16 PROCEDURE — 97161 PT EVAL LOW COMPLEX 20 MIN: CPT

## 2024-01-16 PROCEDURE — 97110 THERAPEUTIC EXERCISES: CPT

## 2024-01-16 NOTE — PROGRESS NOTES
LUMBAR SPINE EVALUATION:   Jaron Villalobos    4/18/1970  Referring Physician:  Ranjith Armendariz  Diagnosis: Bilateral lumbar radiculopathy (M54.16)  Lumbar facet arthropathy (M47.816)  Anterolisthesis of lumbar spine (M43.16)  Initial Evaluation Date: 1/16/2024  Date of Surgery: None for this diagnosis   Authorized # of visits NA by 11/15/2024    Precautions/Hx: HTN on meds,     Past medical history was reviewed with Jaron.   No past medical history on file.  No past surgical history on file.    PATIENT SUMMARY   Jaron Villalobos is a 53 year old y/o male who presents to therapy today with complaints of lower sacral pain the radiates into B gluteals R>L.  He has had decreased pain since his injection yesterday.  History of current condition: 4 months ago, lifted.  He was helping others move.  He works as a .   Current functional limitations include, but are not limited to sitting, dressing, getting in out car, lifting.   Prior treatments: injection, meds Gabapentin, meloxicam - constipation, PT in [place]  Recent accidents or falls: no  Jaron describes prior level of function as able to perform all desired ADL's without difficulty. Pt goals included as physical therapy goals below.     ASSESSMENT:   Pt presents with subjective complaints and functional limitations as noted above.  Pt's function and objective finding are consistent with physician's diagnosis.  The results of the objective tests and measures demonstrate the following limitations: postural alterations; decreased lumbar ROM; decreased B hip IR; and B piriformis flexibility.     Pt and PT discussed evaluation findings, pathology, POC and HEP.  Pt voiced understanding and performs HEP correctly without reported pain. Skilled Physical Therapy is medically necessary to address the above impairments and reach functional goals.    In agreement with functional score and clinical rationale, this evaluation involved Low Complexity decision making.      SUBJECTIVE:     Visit count 4/15/2024 1/8     Date 1/16/2024     LBP/ B gluteals      Pain Range 3 to 9/10     Pain Ave 6/10       Better: standing, walking  Sensation: tingling B  Night: falls asleep - difficult ; Position - side; Bedtime - 9-10; Hours of sleep - 8; Wakes - w rolling d/t pain; Sweats - no.  Incontinence: no  Saddle Anesthesia: no  GI: constipation w meds  Stress: mod  Work:  tile installation  Living environment: home, wife  Stairs: 4 steps in, flight to basement  Unexplained wt loss: no  Increased pain w coughing, sneezing, straining in the bathroom: yes  Blood thinners: no  Diabetes: no  Latex Allergy: no  Pacemaker: no     OBJECTIVE:     Objective Initial Evaluation Data 1/16/2024:    Oswestry Disability Index Score  Score: 44 % (1/16/2024 10:34 AM)     Posture 1/16/2024   Alignment R foot in ER and post, thoracic trunk flex, B protracted shoulders, forward head position, flatted lumbar lordosis, L shoulder elevated, flattened upper thoracic spine       Sensation 1/16/2024   Dermatomes Light Touch    Proximal medial thigh R (L2)  wnl   Proximal medial thigh L (L2) wnl   Above knee R (L3) wnl   Above knee L (L3) wnl   Medial arch R(L4) wnl   Medial arch L (L4) wnl   Between 1st - 2nd toes R (L5) wnl   Between 1st - 2nd toes L (L5) wnl   Lateral border of foot R (S1) wnl   Lateral border of foot L (S1) wnl   Popliteal fossa R (S2) wnl   Popliteal fossa L(S2) wnl       ROM Lumbar 1/16/2024   Flexion Min-mod, poor upper lumbar   Extension Max, poor lower lumbar   R SB wnl   L SB wnl   R Rotation wnl   L Rotation min   * pain limiting    ROM Hip 1/16/2024   Flex R 125 125   Flex L 125 124   ER R 45 48   ER L 45 48   IR R 40 16   IR L 40 15   *pain limiting     MMT 5/5 1/16/2024   L2- hip flex R 5   Hip flex L 5   L3- knee ext R 5   Knee ext L 5   L4- ankle DF R 5   Ankle DF L 5   L5- EHL R 5   EHL L 5   S1- ankle PF R 5   Ankle PF L 5   S2- Hams R 5   Hams L 5   *pain limiting    LE flexibility  1/16/2024   Piriformis R Mod*   Piriformis L Mod*   Hams R -18   Hams L -25   *pain limiting    Neural mobility 1/16/2024   SLR R (-) 70   SLR L (-) 70      JIGNESH 1/16/2024   R wnl   L wnl        Imaging:     PROCEDURE: MRI SPINE LUMBAR (CPT=72148)     COMPARISON: [facility], XR LUMBAR SPINE COMPLETE W/FLEX + EXT (CPT=72114), 10/13/2023, 9:25 AM.     INDICATIONS: M43.16 Anterolisthesis of lumbar spine M47.816 Lumbar facet arthropathy M54.16 Bilateral lumbar radiculopathy     TECHNIQUE: A variety of imaging planes and parameters were utilized for visualization of suspected pathology.     Counting Reference: Lumbosacral junction.     For the purposes of this exam, it will be assumed that there are 5 lumbar-type vertebral bodies. L4-L5 is at the level of the iliac crest.     FINDINGS:  PARASPINAL AREA: Normal with no visible mass.    BONES: There are hypertrophic changes of the lower lumbar facet joints. Small anterior endplate osteophytes seen within the lumbar vertebral bodies. There is no acute fracture, dislocation or marrow replacing lesion.  There is type I endplate  degenerative change (edema within the endplates) at L4-L5.  There is a benign hemangioma within the L3 vertebral body.  CORD/CAUDA EQUINA: There is prominence of the dorsal epidural fat seen throughout the lumbar spine which contributes to the canal narrowing.  OTHER: There is grade 1 anterolisthesis of L4 on L5..     LUMBAR DISC LEVELS:  L1-L2: Mild hypertrophic changes of the facet joints and ligamentum flavum without significant canal or foraminal narrowing.  L2-L3: Disk desiccation with bulging disk, facet, and ligamentous hypertrophy results in mild central canal narrowing and mild bilateral neural foraminal narrowing.  L3-L4: Disk desiccation with bulging disk, facet, and ligamentous hypertrophy results in severe central canal narrowing and severe bilateral neural foraminal narrowing.  L4-L5: Disk desiccation with  bulging disk, facet, and ligamentous hypertrophy results in severe central canal narrowing and severe bilateral neural foraminal narrowing.  L5-S1: Disk desiccation with bulging disk, facet, and ligamentous hypertrophy results in moderate central canal narrowing and severe bilateral neural foraminal narrowing. There is a posterior annular disc fissure.               Impression   CONCLUSION:     Degenerative disc and facet disease throughout the lumbar spine, most prominent at L3-L4 and L4-L5, where there is severe narrowing of the canal.     Severe narrowing of the bilateral neural foramen from L3 through S1.     Dorsal epidural lipomatosis contributes to the severe canal narrowing at multiple levels.     Type 1 endplate degenerative changes at L4-L5.           Dictated by (CST): Sly Oilvas MD on 11/11/2023 at 10:50 AM      Finalized by (CST): Sly Olivas MD on 11/11/202  PROCEDURE: XR LUMBAR SPINE COMPLETE W/FLEX + EXT (CPT=72114)     COMPARISON: None.     INDICATIONS: Anterolisthesis of lumbar spine, bilateral lumbar radiculopathy.     TECHNIQUE: Lumbar spine radiographs (minimum 6 views), with flexion and extension views.     FINDINGS:     ALIGNMENT: Grade 1 degenerative spondylolisthesis of L4 on L5.  Minimal retrolisthesis L2 on L3.  VERTEBRAL BODIES:   Intact.  DISC SPACES: Marked narrowing of the L4-5 and L5-S1 disc, moderate narrowing of the L2-3 disc and mild narrowing of the L3-4 disc.  Endplate osteophytes throughout the lumbar spine.  SACROILIAC JOINTS: Normal.    OBLIQUE VIEWS: No pars defect.  Moderate to advanced L4-5 and L5-S1 facet arthrosis.  FLEXION/EXTENSION VIEWS: No subluxation during flexion and extension.    OTHER: Negative.                 Impression   CONCLUSION:  1. Grade 1 degenerative spondylolisthesis of L4 on L5 and minimal retrolisthesis of L2 on L3.  No subluxation with flexion or extension.  2. Multilevel disc and facet degeneration.           Dictated by (CST): Tom Carrillo MD  on 10/13/2023 at 5:16 PM      Finalized by (CST): Tom Carrillo MD on 10/13/2023 at 5:18 PM   3 at 11:01 AM         Treatment performed this date:    Therapeutic Exercise:  Visit #   1/8   Position Exercise HEP 1/16/2024   Prone Lying   X     On elbows  X     Press ups H  X    Standing  Trunk extn H X    Trunk extn wall H X   Neuro Re-ed  X avoidance of radiculopathy sx, start of directional preference   H = HEP. Pt given copies of this exercise for home program.  X = Exercises done this date - pt verbalized understanding and demonstrated competence. All exercises done B unless otherwise indicated.   Pt advised to discontinue exercises that increase pain and to call or return to therapist to discuss.  Each intervention above is specifically prescribed to address the patient's identified impairments, activity limitations, and participation restrictions.    ASSESSMENT:     Physical Therapy Goals: From 1/16/2024 to 4/15/2024  - Created with patient input during initial evaluation   1. Pt will be independent in beginning level of HEP for stretching, posture and strengthening.   2. Pt will be able to don/doff socks/shoes without increased symptoms.  3. Pt will be able to get in/out car without increased symptoms.  4. Pt will be able to sit for 30 min for meal without increased symptoms.  5. Pt will be able to lift 25# without increased symptoms.     PLAN OF CARE:      Frequency / Duration: Patient will be seen for 1-2x/week decreasing to every other week for a total of 18 visits over a 90 day period for therapeutic exercises, posture retraining, therapeutic activities, manual treatment, neuromuscular reeducation, therapeutic pain neuroscience education, patient education, modalities as needed, home exercise program.    Education or treatment limitation: None  Rehab Potential:good    Patient was advised of these findings, precautions, goals of treatment, plan of care, beginning self care and treatment options and has  agreed to actively participate in planning and for this course of care. Pt educated on possible soreness after evaluation w use of modalities as needed (ice/heat), postural corrections and the importance of staying active.    Charges: PT Eval 1, TE 1    Total Timed treatment: 12 min      Total Treatment Time: 45 min    Thank you for your referral. Please co-sign or sign and return this letter via fax as soon as possible to [phone]. If you have any questions, please contact me at Dept: [phone]    Sincerely,  Electronically signed by therapist: Sophie Tate PT    Physician's certification required: Yes  I certify the need for these services furnished under this plan of treatment and while under my care.    X___________________________________________________ Date____________________    Certification From: 1/16/2024  To:4/15/2024        __________________________________________________________________________________________      21st Century Cures Act Notice to Patient: Medical documents like this are made available to patients in the interest of transparency. However, be advised this is a medical document and it is intended as [id number] communication between your medical providers. This medical document may contain abbreviations, assessments, medical data, and results or other terms that are unfamiliar. Medical documents are intended to carry relevant information, facts as evident, and the clinical opinion of the practitioner. As such, this medical document may be written in language that appears blunt or direct. You are encouraged to contact your medical provider and/or [facility] Patient Experience if you have any questions about this medical document.

## 2024-01-22 ENCOUNTER — OFFICE VISIT (OUTPATIENT)
Dept: PHYSICAL THERAPY | Facility: HOSPITAL | Age: 54
End: 2024-01-22
Attending: PHYSICAL MEDICINE & REHABILITATION
Payer: COMMERCIAL

## 2024-01-22 PROCEDURE — 97112 NEUROMUSCULAR REEDUCATION: CPT

## 2024-01-22 PROCEDURE — 97530 THERAPEUTIC ACTIVITIES: CPT

## 2024-01-22 PROCEDURE — 97110 THERAPEUTIC EXERCISES: CPT

## 2024-01-22 NOTE — PROGRESS NOTES
Jaron Lucio Griselda    4/18/1970  Referring Physician:  Ranjith Armendariz  Diagnosis: Bilateral lumbar radiculopathy (M54.16)  Lumbar facet arthropathy (M47.816)  Anterolisthesis of lumbar spine (M43.16)  Initial Evaluation Date: 1/16/2024  Date of Surgery: None for this diagnosis   Authorized # of visits NA by 11/15/2024    Precautions/Hx: HTN on meds,     SUBJECTIVE:     Pt did not have any adverse reaction to the initial evaluation or treatment.  He reports that he feels that he is getting more motion.  He also feels that the shot has worn off.   Visit count 4/15/2024 1/8 2/8    Date 1/16/2024 1/22/2024     LBP/ B gluteals      Pain Range 3 to 9/10 3-9/10    Pain Ave 6/10 5/10         OBJECTIVE:     Treatment performed this date:    Therapeutic Exercise:  Visit #   1/8 2/8   Position Exercise HEP 1/16/2024 1/22/2024    90-90 Lumbar decompression H   X     Pelvic tilt   x    Abdom set w full exhale H  X   Prone Lying   X      On elbows  X      Press ups H  X     Sitting Abdom set w full exhale H   X    Standing  Trunk extn H X     Trunk extn wall H X    Neuro Re-ed  X avoidance of radiculopathy sx, start of directional preference X see assessment    Ther Act Function   X lifting   H = HEP. Pt given copies of this exercise for home program.  X = Exercises done this date - pt verbalized understanding and demonstrated competence. All exercises done B unless otherwise indicated.   Pt advised to discontinue exercises that increase pain and to call or return to therapist to discuss.  Each intervention above is specifically prescribed to address the patient's identified impairments, activity limitations, and participation restrictions.    ASSESSMENT:     Pt doing well with decreased average pain level. Pt educated on disc anatomy and behavior with use of discussion, diagrams, models and demonstration of positioning for neutral control.  Pt asked clarifying questions and verbalized understanding of concepts and application.   Pt educated on stenosis concepts w review of central canal, foramina, facet arthropathy, ligamentum flavum, disc anatomy and behavior.  Pt verbalized understanding. Pt instructed in 90-90 positioning for use to gain lumbar decompression and full spinal ps release.  Pt able to perform beginning level abdominal core exercises in the position.  Instructed in performance at home especially for acute spinal symptoms.  Pt verbalized understanding.   Pt educated on the importance of correct diaphragm control and positioning to gain core abdominal control and neutral lumbopelvic and cervicothoracic alignment.  Pt verbalized understanding.     Physical Therapy Goals: From 1/16/2024 to 4/15/2024  - Created with patient input during initial evaluation   1. Pt will be independent in beginning level of HEP for stretching, posture and strengthening.   2. Pt will be able to don/doff socks/shoes without increased symptoms.  3. Pt will be able to get in/out car without increased symptoms.  4. Pt will be able to sit for 30 min for meal without increased symptoms.  5. Pt will be able to lift 25# without increased symptoms.     PLAN OF CARE:      Continue PT per original plan for therapeutic exercises, posture retraining, therapeutic activities, manual treatment, neuromuscular reeducation, therapeutic pain neuroscience education, patient education, self care home management, home exercise program, and modalities as needed.    Charged Units Units Minutes   Ther Ex 1 14   Neuro 2 23   Ther Activity 1 8   Gait     Man Tx          Total Timed  45   Total Tx Time  45           __________________________________________________________________________________________      21st Century Cures Act Notice to Patient: Medical documents like this are made available to patients in the interest of transparency. However, be advised this is a medical document and it is intended as uega-xa-byfo communication between your medical providers. This medical  document may contain abbreviations, assessments, medical data, and results or other terms that are unfamiliar. Medical documents are intended to carry relevant information, facts as evident, and the clinical opinion of the practitioner. As such, this medical document may be written in language that appears blunt or direct. You are encouraged to contact your medical provider and/or [facility] Patient Experience if you have any questions about this medical document.    Objective Initial Evaluation Data 1/16/2024:    Oswestry Disability Index Score  Score: 44 % (1/16/2024 10:34 AM)     Posture 1/16/2024   Alignment R foot in ER and post, thoracic trunk flex, B protracted shoulders, forward head position, flatted lumbar lordosis, L shoulder elevated, flattened upper thoracic spine       Sensation 1/16/2024   Dermatomes Light Touch    Proximal medial thigh R (L2)  wnl   Proximal medial thigh L (L2) wnl   Above knee R (L3) wnl   Above knee L (L3) wnl   Medial arch R(L4) wnl   Medial arch L (L4) wnl   Between 1st - 2nd toes R (L5) wnl   Between 1st - 2nd toes L (L5) wnl   Lateral border of foot R (S1) wnl   Lateral border of foot L (S1) wnl   Popliteal fossa R (S2) wnl   Popliteal fossa L(S2) wnl       ROM Lumbar 1/16/2024   Flexion Min-mod, poor upper lumbar   Extension Max, poor lower lumbar   R SB wnl   L SB wnl   R Rotation wnl   L Rotation min   * pain limiting    ROM Hip 1/16/2024   Flex R 125 125   Flex L 125 124   ER R 45 48   ER L 45 48   IR R 40 16   IR L 40 15   *pain limiting     MMT 5/5 1/16/2024   L2- hip flex R 5   Hip flex L 5   L3- knee ext R 5   Knee ext L 5   L4- ankle DF R 5   Ankle DF L 5   L5- EHL R 5   EHL L 5   S1- ankle PF R 5   Ankle PF L 5   S2- Hams R 5   Hams L 5   *pain limiting    LE flexibility 1/16/2024   Piriformis R Mod*   Piriformis L Mod*   Hams R -18   Hams L -25   *pain limiting    Neural mobility 1/16/2024   SLR R (-) 70   SLR L (-) 70      JIGNESH 1/16/2024   R wnl   L wnl

## 2024-01-29 ENCOUNTER — OFFICE VISIT (OUTPATIENT)
Dept: PHYSICAL THERAPY | Facility: HOSPITAL | Age: 54
End: 2024-01-29
Attending: PHYSICAL MEDICINE & REHABILITATION
Payer: COMMERCIAL

## 2024-01-29 PROCEDURE — 97110 THERAPEUTIC EXERCISES: CPT

## 2024-01-29 PROCEDURE — 97112 NEUROMUSCULAR REEDUCATION: CPT

## 2024-01-29 PROCEDURE — 97530 THERAPEUTIC ACTIVITIES: CPT

## 2024-01-29 NOTE — PROGRESS NOTES
Jaron Novoapaula    4/18/1970  Referring Physician:  Ranjith Armendariz  Diagnosis: Bilateral lumbar radiculopathy (M54.16)  Lumbar facet arthropathy (M47.816)  Anterolisthesis of lumbar spine (M43.16)  Initial Evaluation Date: 1/16/2024  Date of Surgery: None for this diagnosis   Authorized # of visits NA by 11/15/2024    Precautions/Hx: HTN on meds,     SUBJECTIVE:     Pt reports that he felt better after the 90-90 last visit.  He has remained improved until turning his head and reaching w his R hand this am.  He felt a pain go into the central lower back and R gluteals  Visit count 4/15/2024 1/8 2/8 3/8   Date 1/16/2024 1/22/2024 1/29/2024    LBP/ B gluteals      Pain Range 3 to 9/10 3-9/10 3-9/10   Pain Ave 6/10 5/10 4-5/10        OBJECTIVE:     Treatment performed this date:    Therapeutic Exercise:  Visit #   1/8 2/8 3/8   Position Exercise HEP 1/16/2024 1/22/2024 1/29/2024    Supine SKC    X 5x    DKC    X 10x    LTR    X 10x    Sciatic nerve glide    X 10x    Sciatic nerve glide w ankle DF H   X 10x    Gluteal set H   X 10x poor activation           90-90 Lumbar decompression H   X      Pelvic tilt   x     Abdom set w full exhale H  X    Prone Lying   X       On elbows  X       Press ups H  X       Glut set    X 10x self palp    Gluteal retraining knee lift    X 10x self palp     Gluteal retraining leg lift H   X 10x   Sitting Abdom set w full exhale H   X     Standing  Trunk extn H X      Trunk extn wall H X      Glut set H   X 5x   Neuro Re-ed  X avoidance of radiculopathy sx, start of directional preference X see assessment  X see assessment    Ther Act Function   X lifting X squatting    H = HEP. Pt given copies of this exercise for home program.  X = Exercises done this date - pt verbalized understanding and demonstrated competence. All exercises done B unless otherwise indicated.   Pt advised to discontinue exercises that increase pain and to call or return to therapist to discuss.  Each intervention  above is specifically prescribed to address the patient's identified impairments, activity limitations, and participation restrictions.    ASSESSMENT:     Pt has been doing well with decreased pain and then had flare w rotation to R while driving. Pt had 0/10 pain after performing supine flexion exercises.  Discussed importance of using directional preference immediately at time of flare up of pain.  Pt also instructed in use of nerve glides w radicular symptoms.  Pt assessed and demonstrates good hip extn mobility w decreased strength.  Pt has overfiring of hams and lumbar ps instead of functional activation of gluteals.  Pt unable to fire gluteals correctly for bridging on bolster. Pt educated on importance of end range hip extension control for forward weight shift in locomotion to avoid use of thoracolumbar ps and/or suboccipital substitution patterns.  Discussed progression of gluteal and core activation to allow for safe squat and lifting.      Physical Therapy Goals: From 1/16/2024 to 4/15/2024  - Created with patient input during initial evaluation   1. Pt will be independent in beginning level of HEP for stretching, posture and strengthening.   2. Pt will be able to don/doff socks/shoes without increased symptoms.  3. Pt will be able to get in/out car without increased symptoms.  4. Pt will be able to sit for 30 min for meal without increased symptoms.  5. Pt will be able to lift 25# without increased symptoms.     PLAN OF CARE:      Continue PT per original plan for therapeutic exercises, posture retraining, therapeutic activities, manual treatment, neuromuscular reeducation, therapeutic pain neuroscience education, patient education, self care home management, home exercise program, and modalities as needed.    Charged Units Units Minutes   Ther Ex 2 24   Neuro 1 13   Ther Activity 1 8   Gait     Man Tx          Total Timed  45   Total Tx Time  45            __________________________________________________________________________________________      21st Century Cures Act Notice to Patient: Medical documents like this are made available to patients in the interest of transparency. However, be advised this is a medical document and it is intended as djwo-is-doin communication between your medical providers. This medical document may contain abbreviations, assessments, medical data, and results or other terms that are unfamiliar. Medical documents are intended to carry relevant information, facts as evident, and the clinical opinion of the practitioner. As such, this medical document may be written in language that appears blunt or direct. You are encouraged to contact your medical provider and/or [facility] Patient Experience if you have any questions about this medical document.    Objective Initial Evaluation Data 1/16/2024:    Oswestry Disability Index Score  Score: 44 % (1/16/2024 10:34 AM)     Posture 1/16/2024   Alignment R foot in ER and post, thoracic trunk flex, B protracted shoulders, forward head position, flatted lumbar lordosis, L shoulder elevated, flattened upper thoracic spine       Sensation 1/16/2024   Dermatomes Light Touch    Proximal medial thigh R (L2)  wnl   Proximal medial thigh L (L2) wnl   Above knee R (L3) wnl   Above knee L (L3) wnl   Medial arch R(L4) wnl   Medial arch L (L4) wnl   Between 1st - 2nd toes R (L5) wnl   Between 1st - 2nd toes L (L5) wnl   Lateral border of foot R (S1) wnl   Lateral border of foot L (S1) wnl   Popliteal fossa R (S2) wnl   Popliteal fossa L(S2) wnl       ROM Lumbar 1/16/2024   Flexion Min-mod, poor upper lumbar   Extension Max, poor lower lumbar   R SB wnl   L SB wnl   R Rotation wnl   L Rotation min   * pain limiting    ROM Hip 1/16/2024   Flex R 125 125   Flex L 125 124   ER R 45 48   ER L 45 48   IR R 40 16   IR L 40 15   *pain limiting     MMT 5/5 1/16/2024   L2- hip flex R 5   Hip flex L  5   L3- knee ext R 5   Knee ext L 5   L4- ankle DF R 5   Ankle DF L 5   L5- EHL R 5   EHL L 5   S1- ankle PF R 5   Ankle PF L 5   S2- Hams R 5   Hams L 5        1/29/2024    Hip extn R 4   Hip extn L 4   *pain limiting    LE flexibility 1/16/2024   Piriformis R Mod*   Piriformis L Mod*   Hams R -18   Hams L -25        1/29/2024    Hip extn R prone wnl   Hip extn L prone wnl   *pain limiting    Neural mobility 1/16/2024   SLR R (-) 70   SLR L (-) 70      JIGNESH 1/16/2024   R wnl   L wnl

## 2024-01-30 ENCOUNTER — OFFICE VISIT (OUTPATIENT)
Dept: PHYSICAL MEDICINE AND REHAB | Facility: CLINIC | Age: 54
End: 2024-01-30
Payer: COMMERCIAL

## 2024-01-30 VITALS — WEIGHT: 205 LBS | BODY MASS INDEX: 31.07 KG/M2 | HEIGHT: 68 IN

## 2024-01-30 DIAGNOSIS — M48.062 SPINAL STENOSIS OF LUMBAR REGION WITH NEUROGENIC CLAUDICATION: Primary | ICD-10-CM

## 2024-01-30 PROCEDURE — 99214 OFFICE O/P EST MOD 30 MIN: CPT | Performed by: PHYSICAL MEDICINE & REHABILITATION

## 2024-01-30 PROCEDURE — 3008F BODY MASS INDEX DOCD: CPT | Performed by: PHYSICAL MEDICINE & REHABILITATION

## 2024-01-30 RX ORDER — GABAPENTIN 600 MG/1
600 TABLET ORAL 3 TIMES DAILY
Qty: 90 TABLET | Refills: 0 | Status: SHIPPED | OUTPATIENT
Start: 2024-01-30

## 2024-01-30 NOTE — PATIENT INSTRUCTIONS
-Gabapentin to be increased to 600mg three times daily  -My office will call once injection is approved  -Continue PT and home exercises  -Follow up 2 weeks after injection

## 2024-01-31 NOTE — PROGRESS NOTES
[facility]  OFFICE FOLLOW UP EVALUATION      HISTORY OF PRESENT ILLNESS:     Chief Complaint   Patient presents with    Follow - Up     INJ: 1/15/2024 pt comes in to f/u on L5-S1 interlaminar epidural steroid injection. Admits 40% improvement. Presents with bilateral buttock and hamstring tightness and stabbing pain. Denies PT. Denies weakness. Rates pain 4/10. Currently in PT. Advil or tylenol PRN. Gabapentin 300mg BID.        Patient is following up for low back pain with radiation bilateral lower extremities.  He states good improvement after the intralaminar epidural injection.  He has noted improvement in functional tolerance.  However, he still has functional limitations.  He still has pain that radiates from the low back into the legs.  Rates it to be 4 out of 10.  He is currently in physical therapy and taking Advil twice daily.  Denies any changes in bowel bladder.    PHYSICAL EXAM:   Ht 68\"   Wt 205 lb (93 kg)   BMI 31.17 kg/m²     LUMBAR SPINE:  Inspection: no erythema, swelling, or obvious deformity.  Their iliac crest and shoulder heights are symmetrical.     Palpation: Non tender to palpation of the spinous process.   ROM: FAROM  Strength: 5/5 in bilateral lower extremities  Sensation: Intact to light touch in all dermatomes of the lower extremities  Reflexes: 2/4 at L4 and S1 except 1/4 left S1  Facet Loading: no specific facet pain  Straight leg raise: negative for radicular pain symptoms  Slump test: Positive for pain symptoms for radicular pain symptoms    IMAGING:     MRI lumbar spine completed on 11/11/2023 was personally reviewed which is notable for degenerative disc and facet disease throughout the lumbar spine. There is severe narrowing of the canal at L3-4 and L4-5. There is degenerative anterolisthesis of L4 relative to L5 with reactive endplate degenerative changes at the L4-5 level. Only, there is severe foraminal narrowing bilaterally  from the L3-S1 levels. There is moderate spinal stenosis at the L5-S1 level as well. He has multilevel disc herniations throughout the lumbar spine.     All imaging results were reviewed and discussed with patient.      ASSESSMENT/PLAN:     1. Spinal stenosis of lumbar region with neurogenic claudication        Jaron Villalobos is a 53 year old male following up for low back pain with spinal stenosis.  I recommended repeating the intralaminar epidural steroid injection to see if we can further improve his functional status.  Recommend that he continue gabapentin but increase the dose to 600 mg 3 times daily.  He will continue physical therapy with home exercise program and follow-up with me 2 weeks after the epidural injection.      The patient verbalized understanding with the plan and was in agreement. All questions/concerns were addressed and there were no barriers to learning.  Please note Dragon dictation software was used to dictate this note and may result in inadvertent typos.    Ranjith Armendariz DO, FAAPMR & CAQSM  Physical Medicine and Rehabilitation  Sports and Spine Medicine    PAST MEDICAL HISTORY:   No past medical history on file.      PAST SURGICAL HISTORY:   No past surgical history on file.      CURRENT MEDICATIONS:     Current Outpatient Medications   Medication Sig Dispense Refill    gabapentin 600 MG Oral Tab Take 1 tablet (600 mg total) by mouth 3 (three) times daily. 90 tablet 0    methylPREDNISolone 4 MG Oral Tablet Therapy Pack As directed (Patient not taking: Reported on 1/30/2024) 1 each 0    Meloxicam 7.5 MG Oral Tab Take 1 tablet (7.5 mg total) by mouth every 12 (twelve) hours as needed for Pain. (Patient not taking: Reported on 1/30/2024) 30 tablet 1    HYDROcodone-acetaminophen  MG Oral Tab Take 1 tablet by mouth every 6 (six) hours as needed for Pain. (Patient not taking: Reported on 10/13/2023)      lisinopril 5 MG Oral Tab Take 1 tablet (5 mg total) by mouth daily. 90 tablet 3     clotrimazole-betamethasone 1-0.05 % External Cream Apply 1 Application topically 2 (two) times daily as needed. 60 g 0    Albuterol Sulfate HFA (VENTOLIN HFA) 108 (90 Base) MCG/ACT Inhalation Aero Soln 1-2 puffs every 4-6 hrs as needed 1 Inhaler 1    Fluticasone Propionate 50 MCG/ACT Nasal Suspension 2 sprays by Each Nare route daily. 3 Bottle 3         ALLERGIES:   No Known Allergies      FAMILY HISTORY:     Family History   Problem Relation Age of Onset    Heart Disorder Mother     Hypertension Mother     Alcohol and Other Disorders Associated Mother           SOCIAL HISTORY:     Social History     Socioeconomic History    Marital status:    Tobacco Use    Smoking status: Never    Smokeless tobacco: Never   Vaping Use    Vaping Use: Never used   Substance and Sexual Activity    Alcohol use: Not Currently     Alcohol/week: 1.0 - 2.0 standard drink of alcohol     Types: 1 - 2 Cans of beer per week    Drug use: No   Social History Narrative    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           REVIEW OF SYSTEMS:   A comprehensive 10 point review of systems was completed.  Pertinent positives and negatives noted in the the HPI.      LABS:     Lab Results   Component Value Date     01/11/2023    A1C 5.5 01/11/2023     Lab Results   Component Value Date    WBC 10.2 01/11/2023    RBC 5.02 01/11/2023    HGB 15.2 01/11/2023    HCT 44.3 01/11/2023    MCV 88.2 01/11/2023    MCH 30.3 01/11/2023    MCHC 34.3 01/11/2023    RDW 12.7 01/11/2023    .0 01/11/2023     Lab Results   Component Value Date     (H) 09/20/2023    BUN 18 09/20/2023    BUNCREA 16.5 06/01/2020    CREATSERUM 0.93 09/20/2023    ANIONGAP 2 09/20/2023    GFRNAA 102 01/18/2022    GFRAA 118 01/18/2022    CA 8.9 09/20/2023    OSMOCALC 287 09/20/2023    ALKPHO 64 09/20/2023    AST 22 09/20/2023    ALT 31 09/20/2023    BILT 0.3 09/20/2023    TP 7.1 09/20/2023    ALB 3.6 09/20/2023    GLOBULIN 3.5 09/20/2023     09/20/2023    K 4.1 09/20/2023      09/20/2023    CO2 27.0 09/20/2023     No results found for: \"PTP\", \"PT\", \"INR\"  No results found for: \"VITD\", \"QVITD\", \"[id number]\"

## 2024-02-01 ENCOUNTER — TELEPHONE (OUTPATIENT)
Dept: PHYSICAL MEDICINE AND REHAB | Facility: CLINIC | Age: 54
End: 2024-02-01

## 2024-02-01 DIAGNOSIS — M48.062 PSEUDOCLAUDICATION SYNDROME: Primary | ICD-10-CM

## 2024-02-01 NOTE — TELEPHONE ENCOUNTER
Patient has been scheduled for L5-S1 Interlaminar Epidural Steroid Injection under fluoroscopy guidance  on 2/5/24 at the [facility] with Dr. Armendariz.   -Anesthesia type: Local.  -If scheduling [facility] covid testing required for all procedures whether patient is vaccinated or not.  -Patient informed not to eat or drink anything after midnight the night prior to the procedure, if being sedated. (For afternoon injections: Patient to fast 6-8 hours prior to procedure with IVCS/MAC. )  -Patient was advised that if he/she does receive the covid vaccine it needs to be at least 2 weeks before or after the injection.  -Medications and allergies reviewed.  -Patient reminded to hold NSAIDs (Ibuprofen, ASA 81, Aleve, Naproxen, Mobic, Diclofenac, Etodolac, Celebrex etc.) for 3 days prior to LUMBAR FACET JOINT INJECTIONS OR MEDIAL BRANCH BLOCK INJECTIONS  if BMI is greater than 35. For Cervical injections only hold multivitamins, Vitamin E, Fish Oil, Phentermine (Lomaira) for 7 days prior to injection and NSAIDS.   mg to be held for 7 days prior to injections.  -If patient is receiving MAC/IVCS Phentermine (Lomaira), Adlyxin (Lixisenatide), Bydureon BCise (Exenatide-release), Byetta (Exenatide), Mounjaro (Tirezepatide), Ozempic (Semaglutide), Rybelsus (oral demaglutide), Saxenda (Liraglutide), Trulicity (Dulaglutide), Victoriza (Liraglutide), Wegovy (Semaglutide), Berberine (oral natures ozempic) will need to be held for 7 days prior to injection.  -If patient's BMI is greater than 50. Patient is unable to get IVCS/MAC at [facility]. (Options: Patient can go to [facility] under MAC or ENDO under IVCS-reminder physician's slots at ENDO are limited. OR Patient can have the procedure done under local anesthesia at [facility] with Valium ( per physician's preference.)    -If on blood thinner clearance has been received to hold this medication by provider.   -Patient informed he/she will need a  to and from procedure. EFFECTIVE 12/1/23- Per [facility]: \"  Our PAT team will notify the patient that their ride MUST remain onsite for the entirety of their visit if the ride is unable to do so the procedure will be canceled. \"    -[facility] is located in the [facility] 1st floor. Patient may park in the yellow or purple parking.    Follow up appointment has been scheduled for patient on: 2/21/24    Patient verbalized understanding and agrees with plan.  -----> Scheduled in Epic: Yes  -----> Scheduled in [employer]/Surgical Case Request: Yes

## 2024-02-05 ENCOUNTER — APPOINTMENT (OUTPATIENT)
Dept: SURGERY | Facility: CLINIC | Age: 54
End: 2024-02-05
Payer: COMMERCIAL

## 2024-02-05 ENCOUNTER — APPOINTMENT (OUTPATIENT)
Dept: PHYSICAL THERAPY | Facility: HOSPITAL | Age: 54
End: 2024-02-05
Attending: PHYSICAL MEDICINE & REHABILITATION
Payer: COMMERCIAL

## 2024-02-12 ENCOUNTER — OFFICE VISIT (OUTPATIENT)
Dept: PHYSICAL THERAPY | Facility: HOSPITAL | Age: 54
End: 2024-02-12
Attending: PHYSICAL MEDICINE & REHABILITATION
Payer: COMMERCIAL

## 2024-02-12 PROCEDURE — 97112 NEUROMUSCULAR REEDUCATION: CPT

## 2024-02-12 PROCEDURE — 97530 THERAPEUTIC ACTIVITIES: CPT

## 2024-02-12 PROCEDURE — 97110 THERAPEUTIC EXERCISES: CPT

## 2024-02-12 NOTE — PROGRESS NOTES
Jaron Novoapaula    4/18/1970  Referring Physician:  Ranjith Armendariz  Diagnosis: Bilateral lumbar radiculopathy (M54.16)  Lumbar facet arthropathy (M47.816)  Anterolisthesis of lumbar spine (M43.16)  Initial Evaluation Date: 1/16/2024  Date of Surgery: None for this diagnosis   Authorized # of visits NA by 11/15/2024    Precautions/Hx: HTN on meds,     SUBJECTIVE:     Pt reports that he had an injection a week ago Monday.  He states that he is improved about 60%.  He has some remaining pain into the post thighs that is more like tightness.  He has not had pain into the feet.   Visit count 4/15/2024 1/8 2/8 3/8 4/8   Date 1/16/2024 1/22/2024 1/29/2024 2/12/2024    LBP/ B gluteals       Pain Range 3 to 9/10 3-9/10 3-9/10 2-5/10   Pain Ave 6/10 5/10 4-5/10 3-4/10   Pain Current --- --- --- 1-2/10        OBJECTIVE:     Treatment performed this date:    Therapeutic Exercise:  Visit #   2/8 3/8 4/8   Position Exercise HEP 1/22/2024 1/29/2024 2/12/2024    Supine SKC   X 5x X 5x    DKC   X 10x X 5x    LTR   X 10x X 5x    Sciatic nerve glide   X 10x     Sciatic nerve glide w ankle DF H  X 10x 10x    Gluteal set H  X 10x poor activation     MARK T8 abdom set ribs down full exhale H   X 10x heavy cues    Pelvic tilt    X     Abdom set full exhale    X     Sahrmann skc    X 10x    Sahrmann dkc H   X 10x well challenged   90-90 Lumbar decompression H  X       Pelvic tilt  x      Abdom set w full exhale H X     Prone Lying         On elbows        Press ups H        Glut set   X 10x self palp     Gluteal retraining knee lift   X 10x self palp      Gluteal retraining leg lift H  X 10x    Sitting Abdom set w full exhale H  X      Standing  Trunk extn H       Trunk extn wall H       Glut set H  X 5x    Neuro Re-ed  X see assessment  X see assessment  X see assessment    Ther Act Function  X lifting X squatting  X rise from floor   H = HEP. Pt given copies of this exercise for home program.  X = Exercises done this date - pt  verbalized understanding and demonstrated competence. All exercises done B unless otherwise indicated.   Pt advised to discontinue exercises that increase pain and to call or return to therapist to discuss.  Each intervention above is specifically prescribed to address the patient's identified impairments, activity limitations, and participation restrictions.    ASSESSMENT:     Pt doing well with decrease pain post injection.  Discussed that injections can help greatly with acute symptoms, but can fade if lumbopelvic control is not remedied.  Continue to emphasize corrected body mechanics and use of abdominal activation w breath control to gain best lumbar stability especially for lifting.  Pt did well with core control progression as noted above.  Pt will continue to benefit from PT intervention to achieve goals.      Physical Therapy Goals: From 1/16/2024 to 4/15/2024  - Created with patient input during initial evaluation   1. Pt will be independent in beginning level of HEP for stretching, posture and strengthening.   2. Pt will be able to don/doff socks/shoes without increased symptoms.  3. Pt will be able to get in/out car without increased symptoms.  4. Pt will be able to sit for 30 min for meal without increased symptoms.  5. Pt will be able to lift 25# without increased symptoms.     PLAN OF CARE:      Assess response to [facility] lumbopelvic control progression.  Continue PT per original plan for therapeutic exercises, posture retraining, therapeutic activities, manual treatment, neuromuscular reeducation, therapeutic pain neuroscience education, patient education, self care home management, home exercise program, and modalities as needed.    Charged Units Units Minutes   Ther Ex 2 24   Neuro 1 13   Ther Activity 1 8   Gait     Man Tx          Total Timed  45   Total Tx Time  45           __________________________________________________________________________________________      21st Century Cures Act  Notice to Patient: Medical documents like this are made available to patients in the interest of transparency. However, be advised this is a medical document and it is intended as kdja-mi-kibi communication between your medical providers. This medical document may contain abbreviations, assessments, medical data, and results or other terms that are unfamiliar. Medical documents are intended to carry relevant information, facts as evident, and the clinical opinion of the practitioner. As such, this medical document may be written in language that appears blunt or direct. You are encouraged to contact your medical provider and/or [facility] if you have any questions about this medical document.    Objective Initial Evaluation Data 1/16/2024:    Oswestry Disability Index Score  Score: 44 % (1/16/2024 10:34 AM)     Posture 1/16/2024   Alignment R foot in ER and post, thoracic trunk flex, B protracted shoulders, forward head position, flatted lumbar lordosis, L shoulder elevated, flattened upper thoracic spine       Sensation 1/16/2024   Dermatomes Light Touch    Proximal medial thigh R (L2)  wnl   Proximal medial thigh L (L2) wnl   Above knee R (L3) wnl   Above knee L (L3) wnl   Medial arch R(L4) wnl   Medial arch L (L4) wnl   Between 1st - 2nd toes R (L5) wnl   Between 1st - 2nd toes L (L5) wnl   Lateral border of foot R (S1) wnl   Lateral border of foot L (S1) wnl   Popliteal fossa R (S2) wnl   Popliteal fossa L(S2) wnl       ROM Lumbar 1/16/2024   Flexion Min-mod, poor upper lumbar   Extension Max, poor lower lumbar   R SB wnl   L SB wnl   R Rotation wnl   L Rotation min   * pain limiting    ROM Hip 1/16/2024   Flex R 125 125   Flex L 125 124   ER R 45 48   ER L 45 48   IR R 40 16   IR L 40 15   *pain limiting     MMT 5/5 1/16/2024   L2- hip flex R 5   Hip flex L 5   L3- knee ext R 5   Knee ext L 5   L4- ankle DF R 5   Ankle DF L 5   L5- EHL R 5   EHL L 5   S1- ankle PF R 5   Ankle PF L  5   S2- Hams R 5   Hams L 5        1/29/2024    Hip extn R 4   Hip extn L 4   *pain limiting    LE flexibility 1/16/2024   Piriformis R Mod*   Piriformis L Mod*   Hams R -18   Hams L -25        1/29/2024    Hip extn R prone wnl   Hip extn L prone wnl   *pain limiting    Neural mobility 1/16/2024   SLR R (-) 70   SLR L (-) 70      JIGNESH 1/16/2024   R wnl   L wnl

## 2024-02-19 ENCOUNTER — OFFICE VISIT (OUTPATIENT)
Dept: PHYSICAL THERAPY | Facility: HOSPITAL | Age: 54
End: 2024-02-19
Attending: PHYSICAL MEDICINE & REHABILITATION
Payer: COMMERCIAL

## 2024-02-19 PROCEDURE — 97110 THERAPEUTIC EXERCISES: CPT

## 2024-02-19 PROCEDURE — 97530 THERAPEUTIC ACTIVITIES: CPT

## 2024-02-19 PROCEDURE — 97112 NEUROMUSCULAR REEDUCATION: CPT
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
## 2024-02-21 ENCOUNTER — TELEMEDICINE (OUTPATIENT)
Dept: PHYSICAL MEDICINE AND REHAB | Facility: CLINIC | Age: 54
End: 2024-02-21
Payer: COMMERCIAL

## 2024-02-21 DIAGNOSIS — M48.062 SPINAL STENOSIS OF LUMBAR REGION WITH NEUROGENIC CLAUDICATION: Primary | ICD-10-CM

## 2024-02-21 PROCEDURE — 99214 OFFICE O/P EST MOD 30 MIN: CPT | Performed by: PHYSICAL MEDICINE & REHABILITATION

## 2024-02-21 NOTE — PROGRESS NOTES
[facility]    Telemedicine Visit - Follow Up Evaluation    Telehealth Verbal Consent   I conducted a telehealth visit with Jaron Villalobos today, 02/21/24, which was completed using two-way, real-time interactive audio and video communication. This has been done in good ying to provide continuity of care in the best interest of the provider-patient relationship, due to the COVID - public health crisis/national emergency where restrictions of face-to-face office visits are ongoing. Every conscious effort was taken to allow for sufficient and adequate time to complete the visit.  The patient was made aware of the limitations of the telehealth visit, including treatment limitations as no physical exam could be performed.  The patient was advised to call 911 or to go to the ER in case there was an emergency.  The patient was also advised of the potential privacy & security concerns related to the telehealth platform.   The patient was made aware of where to find [facility]'s notice of privacy practices, telehealth consent form and other related consent forms and documents.  which are located on the [facility] website. The patient verbally agreed to telehealth consent form, related consents and the risks discussed.    Lastly, the patient confirmed that they were in [place].   Included in this visit, time may have been spent reviewing labs, medications, radiology tests and decision making. Appropriate medical decision-making and tests are ordered as detailed in the plan of care above.  Coding/billing information is submitted for this visit based on complexity of care and/or time spent for the visit.      HISTORY OF PRESENT ILLNESS:     Patient is following up after L5-S1 interlaminar epidural steroid injection.  Patient states he has had great improvement.  He has very minimal pain.  He is able to ambulate and function without any discomfort.  He is taking over-the-counter NSAID  only as needed but is taking gabapentin 600 mg twice daily.  He continues PT and home exercises and is doing well with his progress.      IMAGING:   No new imaging    All imaging results were reviewed and discussed with patient.      ASSESSMENT:     1. Spinal stenosis of lumbar region with neurogenic claudication        PLAN:   Jaron Villalobos is a 53 year old male following up for low back pain with spinal stenosis and neurogenic claudication.  He has responded really well to the lumbar epidural steroid injection.  Recommend he continue gabapentin 600 mg twice daily and follow-up with me in 3 months in the office.  He will continue PT and home exercise program.      Follow-up:  3 months    We discussed that a telemedicine visit is in place of an office visit; however, this limits the ability to perform a thorough physical examination which may affect objective findings related to a specific condition and can affect treatment.    The patient verbalized understanding with this plan and was in agreement.  There are no barriers to learning.  All questions were answered.  Please note Dragon dictation software was used to dictate this note which may result in inadvertent typos.    Ranjith Armendariz D.O. FAAPMR & CAQSM  Physical Medicine and Rehabilitation/Sports Medicine    PAST MEDICAL HISTORY:     Past Medical History:   Diagnosis Date    High blood pressure          PAST SURGICAL HISTORY:   History reviewed. No pertinent surgical history.      CURRENT MEDICATIONS:     Current Outpatient Medications   Medication Sig Dispense Refill    gabapentin 600 MG Oral Tab Take 1 tablet (600 mg total) by mouth 3 (three) times daily. 90 tablet 0    methylPREDNISolone 4 MG Oral Tablet Therapy Pack As directed (Patient not taking: Reported on 1/30/2024) 1 each 0    Meloxicam 7.5 MG Oral Tab Take 1 tablet (7.5 mg total) by mouth every 12 (twelve) hours as needed for Pain. (Patient not taking: Reported on 1/30/2024) 30 tablet 1     HYDROcodone-acetaminophen  MG Oral Tab Take 1 tablet by mouth every 6 (six) hours as needed for Pain. (Patient not taking: Reported on 10/13/2023)      lisinopril 5 MG Oral Tab Take 1 tablet (5 mg total) by mouth daily. 90 tablet 3    clotrimazole-betamethasone 1-0.05 % External Cream Apply 1 Application topically 2 (two) times daily as needed. 60 g 0    Albuterol Sulfate HFA (VENTOLIN HFA) 108 (90 Base) MCG/ACT Inhalation Aero Soln 1-2 puffs every 4-6 hrs as needed 1 Inhaler 1    Fluticasone Propionate 50 MCG/ACT Nasal Suspension 2 sprays by Each Nare route daily. 3 Bottle 3         ALLERGIES:   No Known Allergies      FAMILY HISTORY:     Family History   Problem Relation Age of Onset    Heart Disorder Mother     Hypertension Mother     Alcohol and Other Disorders Associated Mother           SOCIAL HISTORY:     Social History     Socioeconomic History    Marital status:    Tobacco Use    Smoking status: Never    Smokeless tobacco: Never   Vaping Use    Vaping Use: Never used   Substance and Sexual Activity    Alcohol use: Not Currently     Alcohol/week: 1.0 - 2.0 standard drink of alcohol     Types: 1 - 2 Cans of beer per week    Drug use: No   Social History Narrative    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           REVIEW OF SYSTEMS:   As noted in HPI      PHYSICAL EXAM:   General: No immediate distress  Head: Normocephalic/ Atraumatic  Eyes: Extra-occular movements intact  Ears/Nose/Throat:  External appearance identifies normal appearance without obvious deformity  Cardiovascular: No cyanosis, clubbing or edema  Respiratory: Non-labored respirations  Skin: No lesions noted   Neurological: alert & oriented x 3, attentive, able to follow commands, comprehention intact, spontaneous speech intact  Psychiatric: Mood and affect appropriate  Musculoskeletal Exam:  No change since last exam        LABS:     Lab Results   Component Value Date     01/11/2023    A1C 5.5 01/11/2023     Lab Results    Component Value Date    WBC 10.2 01/11/2023    RBC 5.02 01/11/2023    HGB 15.2 01/11/2023    HCT 44.3 01/11/2023    MCV 88.2 01/11/2023    MCH 30.3 01/11/2023    MCHC 34.3 01/11/2023    RDW 12.7 01/11/2023    .0 01/11/2023     Lab Results   Component Value Date     (H) 09/20/2023    BUN 18 09/20/2023    BUNCREA 16.5 06/01/2020    CREATSERUM 0.93 09/20/2023    ANIONGAP 2 09/20/2023    GFRNAA 102 01/18/2022    GFRAA 118 01/18/2022    CA 8.9 09/20/2023    OSMOCALC 287 09/20/2023    ALKPHO 64 09/20/2023    AST 22 09/20/2023    ALT 31 09/20/2023    BILT 0.3 09/20/2023    TP 7.1 09/20/2023    ALB 3.6 09/20/2023    GLOBULIN 3.5 09/20/2023     09/20/2023    K 4.1 09/20/2023     09/20/2023    CO2 27.0 09/20/2023     No results found for: \"PTP\", \"PT\", \"INR\"  No results found for: \"VITD\", \"QVITD\", \"[id number]\"

## 2024-02-26 ENCOUNTER — APPOINTMENT (OUTPATIENT)
Dept: PHYSICAL THERAPY | Facility: HOSPITAL | Age: 54
End: 2024-02-26
Attending: PHYSICAL MEDICINE & REHABILITATION
Payer: COMMERCIAL

## 2024-03-05 ENCOUNTER — OFFICE VISIT (OUTPATIENT)
Dept: PHYSICAL THERAPY | Facility: HOSPITAL | Age: 54
End: 2024-03-05
Attending: PHYSICAL MEDICINE & REHABILITATION
Payer: COMMERCIAL

## 2024-03-05 PROCEDURE — 97530 THERAPEUTIC ACTIVITIES: CPT

## 2024-03-05 PROCEDURE — 97110 THERAPEUTIC EXERCISES: CPT

## 2024-03-05 PROCEDURE — 97112 NEUROMUSCULAR REEDUCATION: CPT

## 2024-03-05 NOTE — PROGRESS NOTES
Jaron Lucio Griselda    4/18/1970  Referring Physician:  Ranjith Armendariz  Diagnosis: Bilateral lumbar radiculopathy (M54.16)  Lumbar facet arthropathy (M47.816)  Anterolisthesis of lumbar spine (M43.16)  Initial Evaluation Date: 1/16/2024  Date of Surgery: None for this diagnosis   Authorized # of visits NA by 11/15/2024    Precautions/Hx: HTN on meds,     SUBJECTIVE:     Pt reports that he is doing well with low level of pain.  He states that he is careful with how he lifts.   Visit count 4/15/2024 1/8 2/8 3/8 4/8 5/8 6/8   Date 1/16/2024 1/22/2024  1/29/2024  2/12/2024  2/19/2024  3/5/2024    LBP/ B gluteals         Pain Range 3 to 9/10 3-9/10 3-9/10 2-5/10 2-4/10 2-4/10   Pain Ave 6/10 5/10 4-5/10 3-4/10 3/10 3/10   Pain Current --- --- --- 1-2/10 3-4/10 2-3/10        OBJECTIVE:     Treatment performed this date:    Therapeutic Exercise:  Visit #   4/8 5/8 6/8   Position Exercise HEP 2/12/2024  2/19/2024  3/5/2024    Supine SKC  X 5x      DKC  X 5x      LTR  X 5x      Sciatic nerve glide        Sciatic nerve glide w ankle DF H 10x      Gluteal set H       MARK T8 abdom set ribs down full exhale H X 10x heavy cues      Pelvic tilt  X       Abdom set full exhale  X       Sahrmann skc  X 10x      Sahrmann dkc H X 10x well challenged  10x    Bridging    X 5x, 10x     Bridging on bolster H  X 5x2 heavy cues 10x    Bridging toes up   X 10x     Bridging feet together   X 7x     Hip add ball   X 7x     Bridging w hip add   X 7x     Unilat bridging cross legs   X 5x 7x    Bent knee fallouts H  X 10x 10x   90-90 Lumbar decompression H        Pelvic tilt        Abdom set w full exhale H      Prone Lying         On elbows        Press ups H        Glut set        Gluteal retraining knee lift        Gluteal retraining leg lift H      Sitting Abdom set w full exhale H        Thoracic extn chair H   X 7x    Trunk rot chair    3x   Side sit Hip IR stretch H  X 1x45s    Standing  Trunk extn H       Trunk extn wall H       Glut set H  Pt calling to check on status of refill request below. Please advise. Thanks.        Neuro Re-ed  X see assessment  X see assessment  X see assessment    Ther Act Function  X rise from floor X gait pattern X lifting box 12.5#   H = HEP. Pt given copies of this exercise for home program.  X = Exercises done this date - pt verbalized understanding and demonstrated competence. All exercises done B unless otherwise indicated.   Pt advised to discontinue exercises that increase pain and to call or return to therapist to discuss.  Each intervention above is specifically prescribed to address the patient's identified impairments, activity limitations, and participation restrictions.    ASSESSMENT:     Pt continues to do well with good decrease of min, max and ave pain levels w some fluctuation.  Pt continues to benefit from cues to avoid breath holding for stability.  Pt educated further on use of flexion positioning of lumbar spine for stenosis sx.  Pt educated on mobility and postural deficits w review of imaging to reinforce corrective exercises and positional awareness. se of thoracic extension for overall mobility.  Reviewed importance of correct breath work for lifting to avoid further lumbar loading and compression.  Patient instructed in body mechanics for squat lift. Pt started by lifting a pillow from 24\" height then from the floor. Instructed in the importance of engaging core w exhale prior to lift.       Physical Therapy Goals: From 1/16/2024 to 4/15/2024  - Created with patient input during initial evaluation   1. Pt will be independent in beginning level of HEP for stretching, posture and strengthening.   2. Pt will be able to don/doff socks/shoes without increased symptoms.  3. Pt will be able to get in/out car without increased symptoms.  4. Pt will be able to sit for 30 min for meal without increased symptoms.  5. Pt will be able to lift 25# without increased symptoms.     PLAN OF CARE:      Assess response to progression of lifting education for work function. Continue PT per  original plan for therapeutic exercises, posture retraining, therapeutic activities, manual treatment, neuromuscular reeducation, therapeutic pain neuroscience education, patient education, self care home management, home exercise program, and modalities as needed.    Charged Units Units Minutes    Ther Ex 1 14   Neuro 2 23   Ther Activity 1 8   Gait     Man Tx          Total Timed  45   Total Tx Time  45           __________________________________________________________________________________________      21st Century Cures Act Notice to Patient: Medical documents like this are made available to patients in the interest of transparency. However, be advised this is a medical document and it is intended as gsko-kr-vaym communication between your medical providers. This medical document may contain abbreviations, assessments, medical data, and results or other terms that are unfamiliar. Medical documents are intended to carry relevant information, facts as evident, and the clinical opinion of the practitioner. As such, this medical document may be written in language that appears blunt or direct. You are encouraged to contact your medical provider and/or [facility] Patient Experience if you have any questions about this medical document.    Objective Initial Evaluation Data 1/16/2024:    Oswestry Disability Index Score  Score: 44 % (1/16/2024 10:34 AM)     Posture 1/16/2024   Alignment R foot in ER and post, thoracic trunk flex, B protracted shoulders, forward head position, flatted lumbar lordosis, L shoulder elevated, flattened upper thoracic spine       Sensation 1/16/2024   Dermatomes Light Touch    Proximal medial thigh R (L2)  wnl   Proximal medial thigh L (L2) wnl   Above knee R (L3) wnl   Above knee L (L3) wnl   Medial arch R(L4) wnl   Medial arch L (L4) wnl   Between 1st - 2nd toes R (L5) wnl   Between 1st - 2nd toes L (L5) wnl   Lateral border of foot R (S1) wnl   Lateral border of foot L (S1)  wnl   Popliteal fossa R (S2) wnl   Popliteal fossa L(S2) wnl       ROM Lumbar 1/16/2024   Flexion Min-mod, poor upper lumbar   Extension Max, poor lower lumbar   R SB wnl   L SB wnl   R Rotation wnl   L Rotation min   * pain limiting    ROM Hip 1/16/2024   Flex R 125 125   Flex L 125 124   ER R 45 48   ER L 45 48   IR R 40 16   IR L 40 15   *pain limiting     MMT 5/5 1/16/2024   L2- hip flex R 5   Hip flex L 5   L3- knee ext R 5   Knee ext L 5   L4- ankle DF R 5   Ankle DF L 5   L5- EHL R 5   EHL L 5   S1- ankle PF R 5   Ankle PF L 5   S2- Hams R 5   Hams L 5        1/29/2024    Hip extn R 4   Hip extn L 4   *pain limiting    LE flexibility 1/16/2024   Piriformis R Mod*   Piriformis L Mod*   Hams R -18   Hams L -25        1/29/2024    Hip extn R prone wnl   Hip extn L prone wnl   *pain limiting    Neural mobility 1/16/2024   SLR R (-) 70   SLR L (-) 70      JIGNESH 1/16/2024   R wnl   L wnl

## 2024-03-12 ENCOUNTER — OFFICE VISIT (OUTPATIENT)
Dept: PHYSICAL THERAPY | Facility: HOSPITAL | Age: 54
End: 2024-03-12
Attending: PHYSICAL MEDICINE & REHABILITATION
Payer: COMMERCIAL

## 2024-03-12 PROCEDURE — 97110 THERAPEUTIC EXERCISES: CPT

## 2024-03-12 PROCEDURE — 97112 NEUROMUSCULAR REEDUCATION: CPT

## 2024-03-12 PROCEDURE — 97530 THERAPEUTIC ACTIVITIES: CPT

## 2024-03-12 NOTE — PROGRESS NOTES
Jaron Novoapaula    4/18/1970  Referring Physician:  Ranjith Armendariz  Diagnosis: Bilateral lumbar radiculopathy (M54.16)  Lumbar facet arthropathy (M47.816)  Anterolisthesis of lumbar spine (M43.16)  Initial Evaluation Date: 1/16/2024  Date of Surgery: None for this diagnosis   Authorized # of visits NA by 11/15/2024    Precautions/Hx: HTN on meds,     SUBJECTIVE:     Pt reports that he continues to have lower level of average pain.  He notes that he bought a new home and will be moving so is doing packing and some lifting which can flare up his pain.   Visit count 4/15/2024 1/8 2/8 3/8 4/8 5/8 6/8 7/8   Date 1/16/2024 1/22/2024  1/29/2024  2/12/2024  2/19/2024  3/5/2024  3/12/2024    LBP/ B gluteals          Pain Range 3 to 9/10 3-9/10 3-9/10 2-5/10 2-4/10 2-4/10 2 to 5-6/10   Pain Ave 6/10 5/10 4-5/10 3-4/10 3/10 3/10 2-3/10   Pain Current --- --- --- 1-2/10 3-4/10 2-3/10 2-3/10        OBJECTIVE:     Treatment performed this date:    Therapeutic Exercise:  Visit #   5/8 6/8 7/8   Position Exercise HEP 2/19/2024  3/5/2024  3/12/2024    Supine [facility]        LTR        Sciatic nerve glide        Sciatic nerve glide w ankle DF H       Gluteal set H       MARK T8 abdom set ribs down full exhale H   X 3x    Pelvic tilt        Abdom set full exhale        [place] [facility]        [place] [facility]   10x     Sahrmann pistoning H   X 10x2    Bridging   X 5x, 10x  X 3x hams cramps, 7x    Glut set    X 10x    Bridging on bolster H X 5x2 heavy cues 10x X 10x2 self palp    Bridging toes up  X 10x      Bridging feet together  X 7x      Hip add ball  X 7x      Bridging w hip add  X 7x      Unilat bridging cross legs  X 5x 7x     Bent knee fallouts H X 10x 10x    90-90 Lumbar decompression H        Pelvic tilt        Abdom set w full exhale H      Sidelying Hip abd on wall    X 10x2 cues    Hip abd H   X 10x2   Prone Lying         On elbows        Press ups H        Glut set        Gluteal retraining knee lift        Gluteal  retraining leg lift H      Sitting Abdom set w full exhale H        Thoracic extn chair H  X 7x     Trunk rot chair   3x    Side sit Hip IR stretch H X 1x45s     Standing  Trunk extn H       Trunk extn wall H       Glut set H       Hip abd H   X 10x   Neuro Re-ed  X see assessment  X see assessment  X see assessment    Ther Act Function  X gait pattern X lifting box 12.5# X lifting box   H = HEP. Pt given copies of this exercise for home program.  X = Exercises done this date - pt verbalized understanding and demonstrated competence. All exercises done B unless otherwise indicated.   Pt advised to discontinue exercises that increase pain and to call or return to therapist to discuss.  Each intervention above is specifically prescribed to address the patient's identified impairments, activity limitations, and participation restrictions.    ASSESSMENT:     Pt doing well with good decrease of average pain.  Pt educated on the effects of weak hip abductors causing loss of pelvic control and Trendelenburg gait.  Pt verbalized understanding.  Continue to emphasize the importance of positioning and muscle activation prior to lifting anything.  Pt tested and demonstrates B weak hip abd.  Added well to HEP.  Pt continues to display overfiring of hams and decreased firing of gluteus max.  Pt improved w cueing and self palp to glut max w bridging on bolster.   Pt educated on the anatomy and importance of transverse abdominis, thoracodorsal fascia to lumbar stability.  Pt verbalized understanding.       Physical Therapy Goals: From 1/16/2024 to 4/15/2024  - Created with patient input during initial evaluation   1. Pt will be independent in beginning level of HEP for stretching, posture and strengthening.   2. Pt will be able to don/doff socks/shoes without increased symptoms.  3. Pt will be able to get in/out car without increased symptoms.  4. Pt will be able to sit for 30 min for meal without increased symptoms.  5. Pt will  be able to lift 25# without increased symptoms.     PLAN OF CARE:      Assess response to lumbopelvic control and hip strengthening w breath control. Continue PT per original plan for therapeutic exercises, posture retraining, therapeutic activities, manual treatment, neuromuscular reeducation, therapeutic pain neuroscience education, patient education, self care home management, home exercise program, and modalities as needed.    Charged Units Units Minutes    Ther Ex 2 23   Neuro 1 12   Ther Activity 1 10   Gait     Man Tx          Total Timed  45   Total Tx Time  45           __________________________________________________________________________________________      21st Century Cures Act Notice to Patient: Medical documents like this are made available to patients in the interest of transparency. However, be advised this is a medical document and it is intended as ivin-ev-qdpy communication between your medical providers. This medical document may contain abbreviations, assessments, medical data, and results or other terms that are unfamiliar. Medical documents are intended to carry relevant information, facts as evident, and the clinical opinion of the practitioner. As such, this medical document may be written in language that appears blunt or direct. You are encouraged to contact your medical provider and/or [facility] Patient Experience if you have any questions about this medical document.    Objective Initial Evaluation Data 1/16/2024:    Oswestry Disability Index Score  Score: 44 % (1/16/2024 10:34 AM)     Posture 1/16/2024   Alignment R foot in ER and post, thoracic trunk flex, B protracted shoulders, forward head position, flatted lumbar lordosis, L shoulder elevated, flattened upper thoracic spine       Sensation 1/16/2024   Dermatomes Light Touch    Proximal medial thigh R (L2)  wnl   Proximal medial thigh L (L2) wnl   Above knee R (L3) wnl   Above knee L (L3) wnl   Medial arch R(L4)  wnl   Medial arch L (L4) wnl   Between 1st - 2nd toes R (L5) wnl   Between 1st - 2nd toes L (L5) wnl   Lateral border of foot R (S1) wnl   Lateral border of foot L (S1) wnl   Popliteal fossa R (S2) wnl   Popliteal fossa L(S2) wnl       ROM Lumbar 1/16/2024   Flexion Min-mod, poor upper lumbar   Extension Max, poor lower lumbar   R SB wnl   L SB wnl   R Rotation wnl   L Rotation min   * pain limiting    ROM Hip 1/16/2024   Flex R 125 125   Flex L 125 124   ER R 45 48   ER L 45 48   IR R 40 16   IR L 40 15   *pain limiting     MMT 5/5 1/16/2024   L2- hip flex R 5   Hip flex L 5   L3- knee ext R 5   Knee ext L 5   L4- ankle DF R 5   Ankle DF L 5   L5- EHL R 5   EHL L 5   S1- ankle PF R 5   Ankle PF L 5   S2- Hams R 5   Hams L 5        1/29/2024    Hip extn R 4   Hip extn L 4        3/12/2024    Hip abd R    Hip abd L    *pain limiting    LE flexibility 1/16/2024   Piriformis R Mod*   Piriformis L Mod*   Hams R -18   Hams L -25        1/29/2024    Hip extn R prone wnl   Hip extn L prone wnl   *pain limiting    Neural mobility 1/16/2024   SLR R (-) 70   SLR L (-) 70      JIGNESH 1/16/2024   R wnl   L wnl

## 2024-03-13 RX ORDER — GABAPENTIN 300 MG/1
300 CAPSULE ORAL 3 TIMES DAILY
Qty: 90 CAPSULE | Refills: 0 | Status: SHIPPED | OUTPATIENT
Start: 2024-03-13

## 2024-03-13 NOTE — TELEPHONE ENCOUNTER
Refill Request    Medication request: GABAPENTIN 300 MG Oral Cap. TAKE 1 CAPSULE BY MOUTH IN THE MORNING, AT NOON AND AT BEDTIME      LOV:1/30/2024 (2/21/24 Televisit) Ranjith Armendariz DO   Due back to clinic per last office note:  Follow-up:  3 months   NOV: Visit date not found      [place] 1/30/2024 #90 (30 days)    Urine drug screen (if applicable): N/A  Pain contract: N/A    LOV plan (if weaning or changing medications): Recommend he continue gabapentin 600 mg twice daily and follow-up with me in 3 months in the office.

## 2024-03-14 RX ORDER — GABAPENTIN 600 MG/1
600 TABLET ORAL 3 TIMES DAILY
Qty: 90 TABLET | Refills: 0 | OUTPATIENT
Start: 2024-03-14

## 2024-03-14 NOTE — TELEPHONE ENCOUNTER
Refill Request    Medication request: gabapentin 600 MG Oral Tab  Take 1 tablet (600 mg total) by mouth 3 (three) times daily.     LOV:1/30/2024 Ranjith Armendariz,    Due back to clinic per last office note:     NOV: Visit date not found      [place]: 3/13/2024 #90           This refill is DENIED due to just dispensed on 3/13/2024.  Too soon for refill- DUPLICATE REQUEST.

## 2024-03-18 ENCOUNTER — OFFICE VISIT (OUTPATIENT)
Dept: PHYSICAL THERAPY | Facility: HOSPITAL | Age: 54
End: 2024-03-18
Attending: PHYSICAL MEDICINE & REHABILITATION
Payer: COMMERCIAL

## 2024-03-18 PROCEDURE — 97530 THERAPEUTIC ACTIVITIES: CPT

## 2024-03-18 PROCEDURE — 97110 THERAPEUTIC EXERCISES: CPT

## 2024-03-18 PROCEDURE — 97112 NEUROMUSCULAR REEDUCATION: CPT

## 2024-03-18 NOTE — PROGRESS NOTES
Jaron Lucio Griselda    4/18/1970  Referring Physician:  Ranjith Armendariz  Diagnosis: Bilateral lumbar radiculopathy (M54.16)  Lumbar facet arthropathy (M47.816)  Anterolisthesis of lumbar spine (M43.16)  Initial Evaluation Date: 1/16/2024  Date of Surgery: None for this diagnosis   Authorized # of visits NA by 11/15/2024    Precautions/Hx: HTN on meds,     SUBJECTIVE:     Pt reports that he is doing well overall.  He notes that he was bent forward and coughed and had a sharp pain.   He was mostly working to pack for his upcoming move this past week.  This coming week he will be busy on the job.    Visit count 4/15/2024 1/8 2/8 3/8 4/8 5/8 6/8 7/8 8/10   Date 1/16/2024 1/22/2024  1/29/2024  2/12/2024  2/19/2024  3/5/2024  3/12/2024  3/18/2024    LBP/ B gluteals           Pain Range 3 to 9/10 3-9/10 3-9/10 2-5/10 2-4/10 2-4/10 2 to 5-6/10 0-1 to 8/10   Pain Ave 6/10 5/10 4-5/10 3-4/10 3/10 3/10 2-3/10 2-3/10   Pain Current --- --- --- 1-2/10 3-4/10 2-3/10 2-3/10 2/10        OBJECTIVE:     Treatment performed this date:    Therapeutic Exercise:  Visit #   6/8 7/8 8/10   Position Exercise HEP 3/5/2024  3/12/2024  3/18/2024    Supine [facility]        DK    X 5x    LTR    X 3x    Sciatic nerve glide        Sciatic nerve glide w ankle DF H       Gluteal set H       MARK T8 abdom set ribs down full exhale H  X 3x X 5x    Pelvic tilt        Abdom set full exhale        Sahrmann [facility]        Sahrmann dk  10x      Sahrmann pistoning   X 10x2     Sahrmann pistoning arms up    X 10x    Sahrmann pistoning arms moving H   X 10x2    Bridging    X 3x hams cramps, 7x X 7x    Glut set   X 10x     Bridging on bolster H 10x X 10x2 self palp X 10x2 self palp    Bridging toes up        Bridging feet together        Hip add ball        Bridging w hip add        Unilat bridging cross legs  7x  X 5x2 hams cramps, cues, self palp    Bent knee fallouts H 10x     90-90 Lumbar decompression H        Pelvic tilt        Abdom set w full exhale H       Sidelying Hip abd on wall   X 10x2 cues     Hip abd H  X 10x2    Prone Lying         On elbows        Press ups H        Glut set        Gluteal retraining knee lift        Gluteal retraining leg lift H      Sitting Abdom set w full exhale H        Thoracic extn chair H X 7x      Trunk rot chair  3x      Functional squat    X 5x    Functional squat w wts H    X 10x2 10# ea hand   Side sit Hip IR stretch H      Standing  Trunk extn H       Trunk extn wall H       Glut set H       Hip abd H  X 10x    Neuro Re-ed  X see assessment  X see assessment  X see assessment    Ther Act Function  X lifting box 12.5# X lifting box X lifting 12.5 to 22.5# w turns   H = HEP. Pt given copies of this exercise for home program.  X = Exercises done this date - pt verbalized understanding and demonstrated competence. All exercises done B unless otherwise indicated.   Pt advised to discontinue exercises that increase pain and to call or return to therapist to discuss.  Each intervention above is specifically prescribed to address the patient's identified impairments, activity limitations, and participation restrictions.    ASSESSMENT:     Pt continues to progress well with overall decrease in ave and min pain.  He had one episode of sharp pain w coughing in flexion.  Pt again educated on stenosis self care and importance of breath control to gain full abdominal stability of lumbar spine.  Pt educated on the importance of slowing down to find the pattern of firing gluteals without hams.  Pt continues to do well to progress lumbopelvic control, abdominal activation, breath control and bridging progression for gluteals.  Pt demonstrating good form w lifting w progressive increase in wt.  Discussed importance of form even for lighter wt items to be back protective and to reinforce correct patterning.  Pt verbalized understanding.      Physical Therapy Goals: From 1/16/2024 to 4/15/2024  - Created with patient input during initial  evaluation   1. Pt will be independent in beginning level of HEP for stretching, posture and strengthening.   2. Pt will be able to don/doff socks/shoes without increased symptoms.  3. Pt will be able to get in/out car without increased symptoms.  4. Pt will be able to sit for 30 min for meal without increased symptoms.  5. Pt will be able to lift 25# without increased symptoms.     PLAN OF CARE:      Assess response to Sahrmann progression.  Continue PT per original plan for therapeutic exercises, posture retraining, therapeutic activities, manual treatment, neuromuscular reeducation, therapeutic pain neuroscience education, patient education, self care home management, home exercise program, and modalities as needed.    Charged Units Units Minutes   Ther Ex 2 30   Neuro 1 12   Ther Activity 1 18   Gait     Man Tx          Total Timed  60   Total Tx Time  60           __________________________________________________________________________________________      21st Century Cures Act Notice to Patient: Medical documents like this are made available to patients in the interest of transparency. However, be advised this is a medical document and it is intended as ogkf-za-[id number] communication between your medical providers. This medical document may contain abbreviations, assessments, medical data, and results or other terms that are unfamiliar. Medical documents are intended to carry relevant information, facts as evident, and the clinical opinion of the practitioner. As such, this medical document may be written in language that appears blunt or direct. You are encouraged to contact your medical provider and/or [facility] Patient Experience if you have any questions about this medical document.    Objective Initial Evaluation Data 1/16/2024:    Oswestry Disability Index Score  Score: 44 % (1/16/2024 10:34 AM)     Posture 1/16/2024   Alignment R foot in ER and post, thoracic trunk flex, B protracted  shoulders, forward head position, flatted lumbar lordosis, L shoulder elevated, flattened upper thoracic spine       Sensation 1/16/2024   Dermatomes Light Touch    Proximal medial thigh R (L2)  wnl   Proximal medial thigh L (L2) wnl   Above knee R (L3) wnl   Above knee L (L3) wnl   Medial arch R(L4) wnl   Medial arch L (L4) wnl   Between 1st - 2nd toes R (L5) wnl   Between 1st - 2nd toes L (L5) wnl   Lateral border of foot R (S1) wnl   Lateral border of foot L (S1) wnl   Popliteal fossa R (S2) wnl   Popliteal fossa L(S2) wnl       ROM Lumbar 1/16/2024   Flexion Min-mod, poor upper lumbar   Extension Max, poor lower lumbar   R SB wnl   L SB wnl   R Rotation wnl   L Rotation min   * pain limiting    ROM Hip 1/16/2024   Flex R 125 125   Flex L 125 124   ER R 45 48   ER L 45 48   IR R 40 16   IR L 40 15   *pain limiting     MMT 5/5 1/16/2024   L2- hip flex R 5   Hip flex L 5   L3- knee ext R 5   Knee ext L 5   L4- ankle DF R 5   Ankle DF L 5   L5- EHL R 5   EHL L 5   S1- ankle PF R 5   Ankle PF L 5   S2- Hams R 5   Hams L 5        1/29/2024    Hip extn R 4   Hip extn L 4        3/12/2024    Hip abd R    Hip abd L    *pain limiting    LE flexibility 1/16/2024   Piriformis R Mod*   Piriformis L Mod*   Hams R -18   Hams L -25        1/29/2024    Hip extn R prone wnl   Hip extn L prone wnl   *pain limiting    Neural mobility 1/16/2024   SLR R (-) 70   SLR L (-) 70      JIGNESH 1/16/2024   R wnl   L wnl

## 2024-03-28 ENCOUNTER — TELEPHONE (OUTPATIENT)
Dept: PHYSICAL THERAPY | Facility: HOSPITAL | Age: 54
End: 2024-03-28

## 2024-04-01 ENCOUNTER — TELEPHONE (OUTPATIENT)
Dept: PHYSICAL THERAPY | Facility: HOSPITAL | Age: 54
End: 2024-04-01

## 2024-04-09 ENCOUNTER — TELEPHONE (OUTPATIENT)
Dept: PHYSICAL THERAPY | Facility: HOSPITAL | Age: 54
End: 2024-04-09

## 2024-04-16 ENCOUNTER — APPOINTMENT (OUTPATIENT)
Dept: PHYSICAL THERAPY | Facility: HOSPITAL | Age: 54
End: 2024-04-16
Attending: PHYSICAL MEDICINE & REHABILITATION
Payer: COMMERCIAL

## 2024-04-22 ENCOUNTER — TELEPHONE (OUTPATIENT)
Dept: PHYSICAL THERAPY | Facility: HOSPITAL | Age: 54
End: 2024-04-22

## 2024-04-22 ENCOUNTER — APPOINTMENT (OUTPATIENT)
Dept: PHYSICAL THERAPY | Facility: HOSPITAL | Age: 54
End: 2024-04-22
Attending: PHYSICAL MEDICINE & REHABILITATION
Payer: COMMERCIAL

## 2024-04-22 NOTE — TELEPHONE ENCOUNTER
Called pt and advised of missed appt this date.  Also advised pt that this was his last visit.  Left dept number and asked pt to call to say if he would like to reschedule the appt or is ready to be discharged from PT.

## 2024-08-22 RX ORDER — GABAPENTIN 600 MG/1
600 TABLET ORAL 3 TIMES DAILY
Qty: 90 TABLET | Refills: 0 | Status: SHIPPED | OUTPATIENT
Start: 2024-08-22

## 2024-08-22 RX ORDER — GABAPENTIN 300 MG/1
300 CAPSULE ORAL 3 TIMES DAILY
Qty: 90 CAPSULE | Refills: 0 | OUTPATIENT
Start: 2024-08-22

## 2024-08-22 NOTE — TELEPHONE ENCOUNTER
S/W patient. Patient reports he is taking 600 mg TABLETS- patient is unable to take the capsules due to his stomach. Takes 2 times daily, sometimes 3.      This RN has discontinued to the 300 mg capsules and confirmed with patient that he does require refill for his 600 mg gabapentin tablets.

## 2024-08-22 NOTE — TELEPHONE ENCOUNTER
Refill Request      LOV:2/21/2024 Ranjith Armendariz, DO   Due back to clinic per last office note:  follow up 3 months  NOV: Visit date not found      [place]: 6/18/2024 #90    Urine drug screen (if applicable): n/a  Pain contract: n/a    LOV plan (if weaning or changing medications): Per Dr. Armendariz's LOV notes: \"continue gabapentin 600 mg twice daily \"           NOTE: LVMTCB 1st attempt- need to know if patient is taking 300 mg TID or 600 mg BID

## 2025-04-05 NOTE — TELEPHONE ENCOUNTER
Initiated authorization for L5-S1 Interlaminar Epidural Steroid Injection under fluoroscopy guidance CPT 77656 dx:M48.062 to be done at [facility] with [facility]  Status: Approved w/ order ID#[id number] valid 2/5/24-3/5/24       18

## (undated) NOTE — LETTER
Date: 3/6/2019    Patient Name: Elan Tilley          To Whom it may concern: This letter has been written at the patient's request. The above patient was seen at the [facility] for treatment of a medical condition.     This patient should b